# Patient Record
Sex: MALE | Race: WHITE | NOT HISPANIC OR LATINO | Employment: OTHER | URBAN - NONMETROPOLITAN AREA
[De-identification: names, ages, dates, MRNs, and addresses within clinical notes are randomized per-mention and may not be internally consistent; named-entity substitution may affect disease eponyms.]

---

## 2022-06-08 ENCOUNTER — OFFICE VISIT (OUTPATIENT)
Dept: FAMILY MEDICINE CLINIC | Facility: CLINIC | Age: 67
End: 2022-06-08

## 2022-06-08 VITALS
WEIGHT: 262.2 LBS | SYSTOLIC BLOOD PRESSURE: 120 MMHG | TEMPERATURE: 97.5 F | OXYGEN SATURATION: 99 % | RESPIRATION RATE: 12 BRPM | BODY MASS INDEX: 32.6 KG/M2 | DIASTOLIC BLOOD PRESSURE: 80 MMHG | HEART RATE: 61 BPM | HEIGHT: 75 IN

## 2022-06-08 DIAGNOSIS — Z79.899 HIGH RISK MEDICATION USE: ICD-10-CM

## 2022-06-08 DIAGNOSIS — E78.2 HYPERLIPIDEMIA, MIXED: Primary | ICD-10-CM

## 2022-06-08 DIAGNOSIS — Z87.891 HISTORY OF TOBACCO USE: ICD-10-CM

## 2022-06-08 DIAGNOSIS — N18.31 CHRONIC KIDNEY DISEASE, STAGE 3A: ICD-10-CM

## 2022-06-08 DIAGNOSIS — U07.1 COVID-19 VIRUS INFECTION: ICD-10-CM

## 2022-06-08 DIAGNOSIS — R97.20 INCREASED PROSTATE SPECIFIC ANTIGEN (PSA) VELOCITY: ICD-10-CM

## 2022-06-08 DIAGNOSIS — Z13.6 SCREENING FOR AAA (ABDOMINAL AORTIC ANEURYSM): ICD-10-CM

## 2022-06-08 DIAGNOSIS — E53.8 VITAMIN B12 DEFICIENCY: ICD-10-CM

## 2022-06-08 DIAGNOSIS — Z12.5 SCREENING FOR PROSTATE CANCER: ICD-10-CM

## 2022-06-08 DIAGNOSIS — E55.9 VITAMIN D DEFICIENCY: ICD-10-CM

## 2022-06-08 DIAGNOSIS — I10 HYPERTENSION, ESSENTIAL: ICD-10-CM

## 2022-06-08 PROCEDURE — 36415 COLL VENOUS BLD VENIPUNCTURE: CPT | Performed by: FAMILY MEDICINE

## 2022-06-08 PROCEDURE — G0009 ADMIN PNEUMOCOCCAL VACCINE: HCPCS | Performed by: FAMILY MEDICINE

## 2022-06-08 PROCEDURE — 90677 PCV20 VACCINE IM: CPT | Performed by: FAMILY MEDICINE

## 2022-06-08 PROCEDURE — 99214 OFFICE O/P EST MOD 30 MIN: CPT | Performed by: FAMILY MEDICINE

## 2022-06-08 RX ORDER — FLUOXETINE HYDROCHLORIDE 40 MG/1
CAPSULE ORAL
COMMUNITY
End: 2022-06-08 | Stop reason: SDUPTHER

## 2022-06-08 RX ORDER — SILDENAFIL 50 MG/1
50 TABLET, FILM COATED ORAL AS NEEDED
Qty: 30 TABLET | Refills: 2 | Status: SHIPPED | OUTPATIENT
Start: 2022-06-08 | End: 2023-01-10 | Stop reason: SDUPTHER

## 2022-06-08 RX ORDER — PRAVASTATIN SODIUM 40 MG
TABLET ORAL
COMMUNITY
End: 2022-06-08 | Stop reason: SDUPTHER

## 2022-06-08 RX ORDER — PANTOPRAZOLE SODIUM 40 MG/1
40 TABLET, DELAYED RELEASE ORAL DAILY
Qty: 90 TABLET | Refills: 1 | Status: SHIPPED | OUTPATIENT
Start: 2022-06-08 | End: 2022-12-05

## 2022-06-08 RX ORDER — PANTOPRAZOLE SODIUM 40 MG/1
TABLET, DELAYED RELEASE ORAL
COMMUNITY
End: 2022-06-08 | Stop reason: SDUPTHER

## 2022-06-08 RX ORDER — FLUOXETINE HYDROCHLORIDE 40 MG/1
40 CAPSULE ORAL DAILY
Qty: 90 CAPSULE | Refills: 1 | Status: SHIPPED | OUTPATIENT
Start: 2022-06-08 | End: 2023-01-10 | Stop reason: SDUPTHER

## 2022-06-08 RX ORDER — ALBUTEROL SULFATE 90 UG/1
2 AEROSOL, METERED RESPIRATORY (INHALATION) EVERY 4 HOURS PRN
Qty: 3 G | Refills: 1 | Status: SHIPPED | OUTPATIENT
Start: 2022-06-08 | End: 2023-01-10

## 2022-06-08 RX ORDER — SILDENAFIL 50 MG/1
TABLET, FILM COATED ORAL
COMMUNITY
End: 2022-06-08 | Stop reason: SDUPTHER

## 2022-06-08 RX ORDER — LISINOPRIL 20 MG/1
20 TABLET ORAL DAILY
Qty: 90 TABLET | Refills: 1 | Status: SHIPPED | OUTPATIENT
Start: 2022-06-08 | End: 2023-01-10

## 2022-06-08 RX ORDER — PRAVASTATIN SODIUM 40 MG
40 TABLET ORAL DAILY
Qty: 90 TABLET | Refills: 1 | Status: SHIPPED | OUTPATIENT
Start: 2022-06-08 | End: 2023-01-05

## 2022-06-08 NOTE — PROGRESS NOTES
Patient Name: Ebenezer Che  : 1955   MRN: [id number]     Chief Complaint:    Chief Complaint   Patient presents with   • Med Refill     Medication refills    • COVID     Pt here for follow up on COVID tested pos 3 weeks       History of Present Illness: Ebenezer Che is a 66 y.o. male who is here today for follow up. On BG and BP  HPI        Review of Systems:   Review of Systems   Constitutional: Negative.    HENT: Negative.    Eyes: Negative.    Respiratory: Negative.    Cardiovascular: Negative.    Gastrointestinal: Negative.    Neurological: Negative.         Past Medical History:   Past Medical History:   Diagnosis Date   • Actinic keratosis    • Anxiety syndrome    • BPH (benign prostatic hyperplasia)    • Diverticulosis    • Essential hypertension    • Eye injury     BB INJURY RIGHT EYE AS TEENAGER   • GERD without esophagitis    • High risk medication use     DRUG THERAPY FINDING   • Hx of adenomatous colonic polyps    • Low vitamin B12 level    • Mixed hyperlipidemia    • Multiple lentigines syndrome (HCC)    • Prediabetes    • Vitamin D deficiency        Past Surgical History:   Past Surgical History:   Procedure Laterality Date   • ENDOSCOPY         Family History: History reviewed. No pertinent family history.    Social History:   Social History     Socioeconomic History   • Marital status:    Tobacco Use   • Smoking status: Former Smoker     Packs/day: 0.25     Years: 36.00     Pack years: 9.00     Start date:      Quit date:      Years since quittin.4   • Smokeless tobacco: Never Used   Substance and Sexual Activity   • Alcohol use: Defer   • Drug use: Never   • Sexual activity: Defer       Medications:     Current Outpatient Medications:   •  FLUoxetine (PROzac) 40 MG capsule, Take 1 capsule by mouth Daily., Disp: 90 capsule, Rfl: 1  •  Metoprolol Succinate 25 MG capsule extended-release 24 hour sprinkle, Take 25 mg by mouth 2 (Two) Times a Day., Disp: 180 capsule, Rfl:  "1  •  pantoprazole (PROTONIX) 40 MG EC tablet, Take 1 tablet by mouth Daily., Disp: 90 tablet, Rfl: 1  •  pravastatin (PRAVACHOL) 40 MG tablet, Take 1 tablet by mouth Daily., Disp: 90 tablet, Rfl: 1  •  sildenafil (VIAGRA) 50 MG tablet, Take 1 tablet by mouth As Needed for Erectile Dysfunction., Disp: 30 tablet, Rfl: 2  •  albuterol sulfate  (90 Base) MCG/ACT inhaler, Inhale 2 puffs Every 4 (Four) Hours As Needed for Wheezing., Disp: 3 g, Rfl: 1  •  lisinopril (PRINIVIL,ZESTRIL) 20 MG tablet, Take 1 tablet by mouth Daily., Disp: 90 tablet, Rfl: 1    Allergies:   No Known Allergies      Physical Exam:  Vital Signs:   Vitals:    06/08/22 1002   BP: 120/80   BP Location: Left arm   Patient Position: Sitting   Cuff Size: Adult   Pulse: 61   Resp: 12   Temp: 97.5 °F (36.4 °C)   SpO2: 99%   Weight: 119 kg (262 lb 3.2 oz)   Height: 190.5 cm (75\")   PainSc: 0-No pain     Body mass index is 32.77 kg/m².     Physical Exam  Vitals and nursing note reviewed.   Constitutional:       Appearance: Normal appearance. He is normal weight.   HENT:      Head: Normocephalic and atraumatic.      Right Ear: Tympanic membrane, ear canal and external ear normal.      Left Ear: Tympanic membrane, ear canal and external ear normal.      Nose: Nose normal.      Mouth/Throat:      Mouth: Mucous membranes are dry.      Pharynx: Oropharynx is clear.   Eyes:      Extraocular Movements: Extraocular movements intact.      Conjunctiva/sclera: Conjunctivae normal.      Pupils: Pupils are equal, round, and reactive to light.   Cardiovascular:      Rate and Rhythm: Normal rate and regular rhythm.      Pulses: Normal pulses.      Heart sounds: Normal heart sounds.   Pulmonary:      Effort: Pulmonary effort is normal.      Breath sounds: Normal breath sounds.   Musculoskeletal:      Cervical back: Normal range of motion and neck supple.   Feet:      Comments:      Neurological:      Mental Status: He is alert. "         Procedures      Assessment/Plan:   Diagnoses and all orders for this visit:    1. Hyperlipidemia, mixed (Primary)  Assessment & Plan:  Blood work today    Orders:  -     FLUoxetine (PROzac) 40 MG capsule; Take 1 capsule by mouth Daily.  Dispense: 90 capsule; Refill: 1  -     Metoprolol Succinate 25 MG capsule extended-release 24 hour sprinkle; Take 25 mg by mouth 2 (Two) Times a Day.  Dispense: 180 capsule; Refill: 1  -     pantoprazole (PROTONIX) 40 MG EC tablet; Take 1 tablet by mouth Daily.  Dispense: 90 tablet; Refill: 1  -     pravastatin (PRAVACHOL) 40 MG tablet; Take 1 tablet by mouth Daily.  Dispense: 90 tablet; Refill: 1  -     sildenafil (VIAGRA) 50 MG tablet; Take 1 tablet by mouth As Needed for Erectile Dysfunction.  Dispense: 30 tablet; Refill: 2  -     lisinopril (PRINIVIL,ZESTRIL) 20 MG tablet; Take 1 tablet by mouth Daily.  Dispense: 90 tablet; Refill: 1  -     [place] aaa screen limited; Future  -     CBC & Differential; Future  -     Lipid Panel; Future  -     Comprehensive Metabolic Panel; Future  -     TSH Rfx On Abnormal To Free T4; Future  -     Hepatitis C Antibody; Future  -     PSA Screen; Future    2. High risk medication use  Assessment & Plan:  Blood work today    Orders:  -     FLUoxetine (PROzac) 40 MG capsule; Take 1 capsule by mouth Daily.  Dispense: 90 capsule; Refill: 1  -     Metoprolol Succinate 25 MG capsule extended-release 24 hour sprinkle; Take 25 mg by mouth 2 (Two) Times a Day.  Dispense: 180 capsule; Refill: 1  -     pantoprazole (PROTONIX) 40 MG EC tablet; Take 1 tablet by mouth Daily.  Dispense: 90 tablet; Refill: 1  -     pravastatin (PRAVACHOL) 40 MG tablet; Take 1 tablet by mouth Daily.  Dispense: 90 tablet; Refill: 1  -     sildenafil (VIAGRA) 50 MG tablet; Take 1 tablet by mouth As Needed for Erectile Dysfunction.  Dispense: 30 tablet; Refill: 2  -     lisinopril (PRINIVIL,ZESTRIL) 20 MG tablet; Take 1 tablet by mouth Daily.  Dispense: 90 tablet; Refill: 1  -      [place] aaa screen limited; Future  -     CBC & Differential; Future  -     Lipid Panel; Future  -     Comprehensive Metabolic Panel; Future  -     TSH Rfx On Abnormal To Free T4; Future  -     Hepatitis C Antibody; Future  -     PSA Screen; Future    3. History of tobacco use  Assessment & Plan:  Screening abdominal ultrasound.    Orders:  -     FLUoxetine (PROzac) 40 MG capsule; Take 1 capsule by mouth Daily.  Dispense: 90 capsule; Refill: 1  -     Metoprolol Succinate 25 MG capsule extended-release 24 hour sprinkle; Take 25 mg by mouth 2 (Two) Times a Day.  Dispense: 180 capsule; Refill: 1  -     pantoprazole (PROTONIX) 40 MG EC tablet; Take 1 tablet by mouth Daily.  Dispense: 90 tablet; Refill: 1  -     pravastatin (PRAVACHOL) 40 MG tablet; Take 1 tablet by mouth Daily.  Dispense: 90 tablet; Refill: 1  -     sildenafil (VIAGRA) 50 MG tablet; Take 1 tablet by mouth As Needed for Erectile Dysfunction.  Dispense: 30 tablet; Refill: 2  -     lisinopril (PRINIVIL,ZESTRIL) 20 MG tablet; Take 1 tablet by mouth Daily.  Dispense: 90 tablet; Refill: 1  -     [place] aaa screen limited; Future  -     CBC & Differential; Future  -     Lipid Panel; Future  -     Comprehensive Metabolic Panel; Future  -     TSH Rfx On Abnormal To Free T4; Future  -     Hepatitis C Antibody; Future  -     PSA Screen; Future    4. Screening for AAA (abdominal aortic aneurysm)  Assessment & Plan:  We will get a screening ultrasound    Orders:  -     FLUoxetine (PROzac) 40 MG capsule; Take 1 capsule by mouth Daily.  Dispense: 90 capsule; Refill: 1  -     Metoprolol Succinate 25 MG capsule extended-release 24 hour sprinkle; Take 25 mg by mouth 2 (Two) Times a Day.  Dispense: 180 capsule; Refill: 1  -     pantoprazole (PROTONIX) 40 MG EC tablet; Take 1 tablet by mouth Daily.  Dispense: 90 tablet; Refill: 1  -     pravastatin (PRAVACHOL) 40 MG tablet; Take 1 tablet by mouth Daily.  Dispense: 90 tablet; Refill: 1  -     sildenafil (VIAGRA) 50 MG tablet; Take 1  tablet by mouth As Needed for Erectile Dysfunction.  Dispense: 30 tablet; Refill: 2  -     lisinopril (PRINIVIL,ZESTRIL) 20 MG tablet; Take 1 tablet by mouth Daily.  Dispense: 90 tablet; Refill: 1  -     [place] aaa screen limited; Future  -     CBC & Differential; Future  -     Lipid Panel; Future  -     Comprehensive Metabolic Panel; Future  -     TSH Rfx On Abnormal To Free T4; Future  -     Hepatitis C Antibody; Future  -     PSA Screen; Future    5. Hypertension, essential  Assessment & Plan:  Discussed with patient to monitor their blood pressure and if systolic blood pressure goes above 140 or diastolic is above 90 to return to clinic.  Take medicines as directed, call for any problems, patient not having or any worrisome symptoms.        Orders:  -     FLUoxetine (PROzac) 40 MG capsule; Take 1 capsule by mouth Daily.  Dispense: 90 capsule; Refill: 1  -     Metoprolol Succinate 25 MG capsule extended-release 24 hour sprinkle; Take 25 mg by mouth 2 (Two) Times a Day.  Dispense: 180 capsule; Refill: 1  -     pantoprazole (PROTONIX) 40 MG EC tablet; Take 1 tablet by mouth Daily.  Dispense: 90 tablet; Refill: 1  -     pravastatin (PRAVACHOL) 40 MG tablet; Take 1 tablet by mouth Daily.  Dispense: 90 tablet; Refill: 1  -     sildenafil (VIAGRA) 50 MG tablet; Take 1 tablet by mouth As Needed for Erectile Dysfunction.  Dispense: 30 tablet; Refill: 2  -     lisinopril (PRINIVIL,ZESTRIL) 20 MG tablet; Take 1 tablet by mouth Daily.  Dispense: 90 tablet; Refill: 1  -     [place] aaa screen limited; Future  -     CBC & Differential; Future  -     Lipid Panel; Future  -     Comprehensive Metabolic Panel; Future  -     TSH Rfx On Abnormal To Free T4; Future  -     Hepatitis C Antibody; Future  -     PSA Screen; Future    6. Chronic kidney disease, stage 3a (HCC)  Assessment & Plan:  Patient is instructed to not take any NSAIDs.  Medicines as directed.  Stay well-hydrated.      Orders:  -     FLUoxetine (PROzac) 40 MG capsule; Take 1  capsule by mouth Daily.  Dispense: 90 capsule; Refill: 1  -     Metoprolol Succinate 25 MG capsule extended-release 24 hour sprinkle; Take 25 mg by mouth 2 (Two) Times a Day.  Dispense: 180 capsule; Refill: 1  -     pantoprazole (PROTONIX) 40 MG EC tablet; Take 1 tablet by mouth Daily.  Dispense: 90 tablet; Refill: 1  -     pravastatin (PRAVACHOL) 40 MG tablet; Take 1 tablet by mouth Daily.  Dispense: 90 tablet; Refill: 1  -     sildenafil (VIAGRA) 50 MG tablet; Take 1 tablet by mouth As Needed for Erectile Dysfunction.  Dispense: 30 tablet; Refill: 2  -     lisinopril (PRINIVIL,ZESTRIL) 20 MG tablet; Take 1 tablet by mouth Daily.  Dispense: 90 tablet; Refill: 1  -     [place] aaa screen limited; Future  -     CBC & Differential; Future  -     Lipid Panel; Future  -     Comprehensive Metabolic Panel; Future  -     TSH Rfx On Abnormal To Free T4; Future  -     Hepatitis C Antibody; Future  -     PSA Screen; Future    7. Vitamin D deficiency  Assessment & Plan:  Blood work today    Orders:  -     FLUoxetine (PROzac) 40 MG capsule; Take 1 capsule by mouth Daily.  Dispense: 90 capsule; Refill: 1  -     Metoprolol Succinate 25 MG capsule extended-release 24 hour sprinkle; Take 25 mg by mouth 2 (Two) Times a Day.  Dispense: 180 capsule; Refill: 1  -     pantoprazole (PROTONIX) 40 MG EC tablet; Take 1 tablet by mouth Daily.  Dispense: 90 tablet; Refill: 1  -     pravastatin (PRAVACHOL) 40 MG tablet; Take 1 tablet by mouth Daily.  Dispense: 90 tablet; Refill: 1  -     sildenafil (VIAGRA) 50 MG tablet; Take 1 tablet by mouth As Needed for Erectile Dysfunction.  Dispense: 30 tablet; Refill: 2  -     lisinopril (PRINIVIL,ZESTRIL) 20 MG tablet; Take 1 tablet by mouth Daily.  Dispense: 90 tablet; Refill: 1  -     [place] aaa screen limited; Future  -     CBC & Differential; Future  -     Lipid Panel; Future  -     Comprehensive Metabolic Panel; Future  -     TSH Rfx On Abnormal To Free T4; Future  -     Hepatitis C Antibody; Future  -      PSA Screen; Future    8. Increased prostate specific antigen (PSA) velocity  Assessment & Plan:  We will check PSA    Orders:  -     FLUoxetine (PROzac) 40 MG capsule; Take 1 capsule by mouth Daily.  Dispense: 90 capsule; Refill: 1  -     Metoprolol Succinate 25 MG capsule extended-release 24 hour sprinkle; Take 25 mg by mouth 2 (Two) Times a Day.  Dispense: 180 capsule; Refill: 1  -     pantoprazole (PROTONIX) 40 MG EC tablet; Take 1 tablet by mouth Daily.  Dispense: 90 tablet; Refill: 1  -     pravastatin (PRAVACHOL) 40 MG tablet; Take 1 tablet by mouth Daily.  Dispense: 90 tablet; Refill: 1  -     sildenafil (VIAGRA) 50 MG tablet; Take 1 tablet by mouth As Needed for Erectile Dysfunction.  Dispense: 30 tablet; Refill: 2  -     lisinopril (PRINIVIL,ZESTRIL) 20 MG tablet; Take 1 tablet by mouth Daily.  Dispense: 90 tablet; Refill: 1  -     [place] aaa screen limited; Future  -     CBC & Differential; Future  -     Lipid Panel; Future  -     Comprehensive Metabolic Panel; Future  -     TSH Rfx On Abnormal To Free T4; Future  -     Hepatitis C Antibody; Future  -     PSA Screen; Future    9. Vitamin B12 deficiency  Assessment & Plan:  Blood work today    Orders:  -     FLUoxetine (PROzac) 40 MG capsule; Take 1 capsule by mouth Daily.  Dispense: 90 capsule; Refill: 1  -     Metoprolol Succinate 25 MG capsule extended-release 24 hour sprinkle; Take 25 mg by mouth 2 (Two) Times a Day.  Dispense: 180 capsule; Refill: 1  -     pantoprazole (PROTONIX) 40 MG EC tablet; Take 1 tablet by mouth Daily.  Dispense: 90 tablet; Refill: 1  -     pravastatin (PRAVACHOL) 40 MG tablet; Take 1 tablet by mouth Daily.  Dispense: 90 tablet; Refill: 1  -     sildenafil (VIAGRA) 50 MG tablet; Take 1 tablet by mouth As Needed for Erectile Dysfunction.  Dispense: 30 tablet; Refill: 2  -     lisinopril (PRINIVIL,ZESTRIL) 20 MG tablet; Take 1 tablet by mouth Daily.  Dispense: 90 tablet; Refill: 1  -     [place] aaa screen limited; Future  -     CBC &  Differential; Future  -     Lipid Panel; Future  -     Comprehensive Metabolic Panel; Future  -     TSH Rfx On Abnormal To Free T4; Future  -     Hepatitis C Antibody; Future  -     PSA Screen; Future    10. Screening for prostate cancer  Assessment & Plan:  We will get a screening ultrasound    Orders:  -     FLUoxetine (PROzac) 40 MG capsule; Take 1 capsule by mouth Daily.  Dispense: 90 capsule; Refill: 1  -     Metoprolol Succinate 25 MG capsule extended-release 24 hour sprinkle; Take 25 mg by mouth 2 (Two) Times a Day.  Dispense: 180 capsule; Refill: 1  -     pantoprazole (PROTONIX) 40 MG EC tablet; Take 1 tablet by mouth Daily.  Dispense: 90 tablet; Refill: 1  -     pravastatin (PRAVACHOL) 40 MG tablet; Take 1 tablet by mouth Daily.  Dispense: 90 tablet; Refill: 1  -     sildenafil (VIAGRA) 50 MG tablet; Take 1 tablet by mouth As Needed for Erectile Dysfunction.  Dispense: 30 tablet; Refill: 2  -     lisinopril (PRINIVIL,ZESTRIL) 20 MG tablet; Take 1 tablet by mouth Daily.  Dispense: 90 tablet; Refill: 1  -     [place] aaa screen limited; Future  -     CBC & Differential; Future  -     Lipid Panel; Future  -     Comprehensive Metabolic Panel; Future  -     TSH Rfx On Abnormal To Free T4; Future  -     Hepatitis C Antibody; Future  -     PSA Screen; Future    11. COVID-19 virus infection  Assessment & Plan:  Tested negative today.  We will give him a ProAir inhaler.      Other orders  -     Pneumococcal Conjugate Vaccine 20-Valent (PCV20)  -     albuterol sulfate  (90 Base) MCG/ACT inhaler; Inhale 2 puffs Every 4 (Four) Hours As Needed for Wheezing.  Dispense: 3 g; Refill: 1           Follow Up:   Return in about 6 months (around 12/8/2022) for Bloodwork 1 week prior to next appointment.    Epifanio Goodman MD  [facility]     Answers for HPI/ROS submitted by the patient on 6/7/2022  What is the primary reason for your visit?: Other  Please describe your symptoms.: Renew my  prescriptions, Post Covid check up  Have you had these symptoms before?: No  How long have you been having these symptoms?: 1-2 weeks  Please describe any probable cause for these symptoms. : Covid

## 2022-06-09 LAB
ALBUMIN SERPL-MCNC: 4.7 G/DL (ref 3.8–4.8)
ALBUMIN/GLOB SERPL: 2 {RATIO} (ref 1.2–2.2)
ALP SERPL-CCNC: 49 IU/L (ref 44–121)
ALT SERPL-CCNC: 32 IU/L (ref 0–44)
AST SERPL-CCNC: 20 IU/L (ref 0–40)
BASOPHILS # BLD AUTO: 0 X10E3/UL (ref 0–0.2)
BASOPHILS NFR BLD AUTO: 1 %
BILIRUB SERPL-MCNC: 0.5 MG/DL (ref 0–1.2)
BUN SERPL-MCNC: 15 MG/DL (ref 8–27)
BUN/CREAT SERPL: 13 (ref 10–24)
CALCIUM SERPL-MCNC: 9.3 MG/DL (ref 8.6–10.2)
CHLORIDE SERPL-SCNC: 103 MMOL/L (ref 96–106)
CHOLEST SERPL-MCNC: 208 MG/DL (ref 100–199)
CO2 SERPL-SCNC: 23 MMOL/L (ref 20–29)
CREAT SERPL-MCNC: 1.16 MG/DL (ref 0.76–1.27)
EGFRCR SERPLBLD CKD-EPI 2021: 70 ML/MIN/1.73
EOSINOPHIL # BLD AUTO: 0.2 X10E3/UL (ref 0–0.4)
EOSINOPHIL NFR BLD AUTO: 3 %
ERYTHROCYTE [DISTWIDTH] IN BLOOD BY AUTOMATED COUNT: 12.6 % (ref 11.6–15.4)
GLOBULIN SER CALC-MCNC: 2.3 G/DL (ref 1.5–4.5)
GLUCOSE SERPL-MCNC: 105 MG/DL (ref 65–99)
HCT VFR BLD AUTO: 46.1 % (ref 37.5–51)
HCV AB S/CO SERPL IA: 0.1 S/CO RATIO (ref 0–0.9)
HDLC SERPL-MCNC: 48 MG/DL
HGB BLD-MCNC: 15.5 G/DL (ref 13–17.7)
IMM GRANULOCYTES # BLD AUTO: 0 X10E3/UL (ref 0–0.1)
IMM GRANULOCYTES NFR BLD AUTO: 0 %
LDLC SERPL CALC-MCNC: 123 MG/DL (ref 0–99)
LYMPHOCYTES # BLD AUTO: 2.2 X10E3/UL (ref 0.7–3.1)
LYMPHOCYTES NFR BLD AUTO: 35 %
MCH RBC QN AUTO: 29.6 PG (ref 26.6–33)
MCHC RBC AUTO-ENTMCNC: 33.6 G/DL (ref 31.5–35.7)
MCV RBC AUTO: 88 FL (ref 79–97)
MONOCYTES # BLD AUTO: 0.5 X10E3/UL (ref 0.1–0.9)
MONOCYTES NFR BLD AUTO: 8 %
NEUTROPHILS # BLD AUTO: 3.5 X10E3/UL (ref 1.4–7)
NEUTROPHILS NFR BLD AUTO: 53 %
PLATELET # BLD AUTO: 338 X10E3/UL (ref 150–450)
POTASSIUM SERPL-SCNC: 4.8 MMOL/L (ref 3.5–5.2)
PROT SERPL-MCNC: 7 G/DL (ref 6–8.5)
PSA SERPL-MCNC: 2.2 NG/ML (ref 0–4)
RBC # BLD AUTO: 5.23 X10E6/UL (ref 4.14–5.8)
SODIUM SERPL-SCNC: 141 MMOL/L (ref 134–144)
TRIGL SERPL-MCNC: 209 MG/DL (ref 0–149)
TSH SERPL DL<=0.005 MIU/L-ACNC: 1.86 UIU/ML (ref 0.45–4.5)
VLDLC SERPL CALC-MCNC: 37 MG/DL (ref 5–40)
WBC # BLD AUTO: 6.4 X10E3/UL (ref 3.4–10.8)

## 2022-06-28 DIAGNOSIS — E78.2 HYPERLIPIDEMIA, MIXED: ICD-10-CM

## 2022-06-28 DIAGNOSIS — Z13.6 SCREENING FOR AAA (ABDOMINAL AORTIC ANEURYSM): ICD-10-CM

## 2022-06-28 DIAGNOSIS — Z12.5 SCREENING FOR PROSTATE CANCER: ICD-10-CM

## 2022-06-28 DIAGNOSIS — E53.8 VITAMIN B12 DEFICIENCY: ICD-10-CM

## 2022-06-28 DIAGNOSIS — Z87.891 HISTORY OF TOBACCO USE: ICD-10-CM

## 2022-06-28 DIAGNOSIS — N18.31 CHRONIC KIDNEY DISEASE, STAGE 3A: ICD-10-CM

## 2022-06-28 DIAGNOSIS — I10 HYPERTENSION, ESSENTIAL: ICD-10-CM

## 2022-06-28 DIAGNOSIS — E55.9 VITAMIN D DEFICIENCY: ICD-10-CM

## 2022-06-28 DIAGNOSIS — Z79.899 HIGH RISK MEDICATION USE: ICD-10-CM

## 2022-06-28 DIAGNOSIS — R97.20 INCREASED PROSTATE SPECIFIC ANTIGEN (PSA) VELOCITY: ICD-10-CM

## 2022-10-01 DIAGNOSIS — E78.2 HYPERLIPIDEMIA, MIXED: ICD-10-CM

## 2022-10-01 DIAGNOSIS — E53.8 VITAMIN B12 DEFICIENCY: ICD-10-CM

## 2022-10-01 DIAGNOSIS — Z12.5 SCREENING FOR PROSTATE CANCER: ICD-10-CM

## 2022-10-01 DIAGNOSIS — R97.20 INCREASED PROSTATE SPECIFIC ANTIGEN (PSA) VELOCITY: ICD-10-CM

## 2022-10-01 DIAGNOSIS — N18.31 CHRONIC KIDNEY DISEASE, STAGE 3A: ICD-10-CM

## 2022-10-01 DIAGNOSIS — Z79.899 HIGH RISK MEDICATION USE: ICD-10-CM

## 2022-10-01 DIAGNOSIS — Z87.891 HISTORY OF TOBACCO USE: ICD-10-CM

## 2022-10-01 DIAGNOSIS — I10 HYPERTENSION, ESSENTIAL: ICD-10-CM

## 2022-10-01 DIAGNOSIS — Z13.6 SCREENING FOR AAA (ABDOMINAL AORTIC ANEURYSM): ICD-10-CM

## 2022-10-01 DIAGNOSIS — E55.9 VITAMIN D DEFICIENCY: ICD-10-CM

## 2022-10-03 RX ORDER — PRAVASTATIN SODIUM 40 MG
TABLET ORAL
Qty: 90 TABLET | Refills: 0 | OUTPATIENT
Start: 2022-10-03

## 2022-12-05 DIAGNOSIS — E55.9 VITAMIN D DEFICIENCY: ICD-10-CM

## 2022-12-05 DIAGNOSIS — I10 HYPERTENSION, ESSENTIAL: ICD-10-CM

## 2022-12-05 DIAGNOSIS — R97.20 INCREASED PROSTATE SPECIFIC ANTIGEN (PSA) VELOCITY: ICD-10-CM

## 2022-12-05 DIAGNOSIS — Z87.891 HISTORY OF TOBACCO USE: ICD-10-CM

## 2022-12-05 DIAGNOSIS — E78.2 HYPERLIPIDEMIA, MIXED: ICD-10-CM

## 2022-12-05 DIAGNOSIS — Z12.5 SCREENING FOR PROSTATE CANCER: ICD-10-CM

## 2022-12-05 DIAGNOSIS — Z79.899 HIGH RISK MEDICATION USE: ICD-10-CM

## 2022-12-05 DIAGNOSIS — Z13.6 SCREENING FOR AAA (ABDOMINAL AORTIC ANEURYSM): ICD-10-CM

## 2022-12-05 DIAGNOSIS — N18.31 CHRONIC KIDNEY DISEASE, STAGE 3A: ICD-10-CM

## 2022-12-05 DIAGNOSIS — E53.8 VITAMIN B12 DEFICIENCY: ICD-10-CM

## 2022-12-05 RX ORDER — PANTOPRAZOLE SODIUM 40 MG/1
TABLET, DELAYED RELEASE ORAL
Qty: 90 TABLET | Refills: 0 | Status: SHIPPED | OUTPATIENT
Start: 2022-12-05 | End: 2023-01-10 | Stop reason: SDUPTHER

## 2022-12-05 NOTE — TELEPHONE ENCOUNTER
Rx Refill Note    Requested Prescriptions     Pending Prescriptions Disp Refills   • pantoprazole (PROTONIX) 40 MG EC tablet [Pharmacy Med Name: Pantoprazole Sodium 40 MG Oral Tablet Delayed Release] 90 tablet 0     Sig: Take 1 tablet by mouth once daily        Last office visit with prescribing clinician: 6/8/2022      Next office visit with prescribing clinician: Visit date not found   Last labs:   Last refill: 06/08/2022   Pharmacy (be sure to add in Epic). correct

## 2023-01-05 DIAGNOSIS — E53.8 VITAMIN B12 DEFICIENCY: ICD-10-CM

## 2023-01-05 DIAGNOSIS — Z12.5 SCREENING FOR PROSTATE CANCER: ICD-10-CM

## 2023-01-05 DIAGNOSIS — R97.20 INCREASED PROSTATE SPECIFIC ANTIGEN (PSA) VELOCITY: ICD-10-CM

## 2023-01-05 DIAGNOSIS — I10 HYPERTENSION, ESSENTIAL: ICD-10-CM

## 2023-01-05 DIAGNOSIS — Z79.899 HIGH RISK MEDICATION USE: ICD-10-CM

## 2023-01-05 DIAGNOSIS — N18.31 CHRONIC KIDNEY DISEASE, STAGE 3A: ICD-10-CM

## 2023-01-05 DIAGNOSIS — E55.9 VITAMIN D DEFICIENCY: ICD-10-CM

## 2023-01-05 DIAGNOSIS — E78.2 HYPERLIPIDEMIA, MIXED: ICD-10-CM

## 2023-01-05 DIAGNOSIS — Z87.891 HISTORY OF TOBACCO USE: ICD-10-CM

## 2023-01-05 DIAGNOSIS — Z13.6 SCREENING FOR AAA (ABDOMINAL AORTIC ANEURYSM): ICD-10-CM

## 2023-01-05 RX ORDER — PRAVASTATIN SODIUM 40 MG
TABLET ORAL
Qty: 90 TABLET | Refills: 0 | Status: SHIPPED | OUTPATIENT
Start: 2023-01-05 | End: 2023-01-10 | Stop reason: SDUPTHER

## 2023-01-05 NOTE — TELEPHONE ENCOUNTER
Rx Refill Note    Requested Prescriptions     Pending Prescriptions Disp Refills   • pravastatin (PRAVACHOL) 40 MG tablet [Pharmacy Med Name: Pravastatin Sodium 40 MG Oral Tablet] 90 tablet 0     Sig: Take 1 tablet by mouth once daily        Last office visit with prescribing clinician: 6/8/2022      Next office visit with prescribing clinician: Visit date not found   Last labs:   Last refill: 06/08/2022   Pharmacy (be sure to add in Epic). correct

## 2023-01-10 ENCOUNTER — OFFICE VISIT (OUTPATIENT)
Dept: FAMILY MEDICINE CLINIC | Facility: CLINIC | Age: 68
End: 2023-01-10
Payer: MEDICARE

## 2023-01-10 VITALS
HEART RATE: 63 BPM | OXYGEN SATURATION: 97 % | BODY MASS INDEX: 34.01 KG/M2 | TEMPERATURE: 98.2 F | SYSTOLIC BLOOD PRESSURE: 136 MMHG | HEIGHT: 75 IN | DIASTOLIC BLOOD PRESSURE: 90 MMHG | WEIGHT: 273.5 LBS

## 2023-01-10 DIAGNOSIS — I10 HYPERTENSION, ESSENTIAL: ICD-10-CM

## 2023-01-10 DIAGNOSIS — E53.8 VITAMIN B12 DEFICIENCY: ICD-10-CM

## 2023-01-10 DIAGNOSIS — E78.2 HYPERLIPIDEMIA, MIXED: ICD-10-CM

## 2023-01-10 DIAGNOSIS — N18.31 CHRONIC KIDNEY DISEASE, STAGE 3A: ICD-10-CM

## 2023-01-10 DIAGNOSIS — Z12.5 SCREENING FOR PROSTATE CANCER: ICD-10-CM

## 2023-01-10 DIAGNOSIS — Z87.891 HISTORY OF TOBACCO USE: ICD-10-CM

## 2023-01-10 DIAGNOSIS — Z79.899 HIGH RISK MEDICATION USE: ICD-10-CM

## 2023-01-10 DIAGNOSIS — E55.9 VITAMIN D DEFICIENCY: ICD-10-CM

## 2023-01-10 DIAGNOSIS — R97.20 INCREASED PROSTATE SPECIFIC ANTIGEN (PSA) VELOCITY: ICD-10-CM

## 2023-01-10 DIAGNOSIS — Z13.6 SCREENING FOR AAA (ABDOMINAL AORTIC ANEURYSM): ICD-10-CM

## 2023-01-10 PROCEDURE — 99214 OFFICE O/P EST MOD 30 MIN: CPT | Performed by: NURSE PRACTITIONER

## 2023-01-10 RX ORDER — PANTOPRAZOLE SODIUM 40 MG/1
40 TABLET, DELAYED RELEASE ORAL DAILY
Qty: 90 TABLET | Refills: 3 | Status: SHIPPED | OUTPATIENT
Start: 2023-01-10

## 2023-01-10 RX ORDER — SILDENAFIL 50 MG/1
50 TABLET, FILM COATED ORAL AS NEEDED
Qty: 30 TABLET | Refills: 5 | Status: SHIPPED | OUTPATIENT
Start: 2023-01-10

## 2023-01-10 RX ORDER — PRAVASTATIN SODIUM 40 MG
40 TABLET ORAL DAILY
Qty: 90 TABLET | Refills: 1 | Status: SHIPPED | OUTPATIENT
Start: 2023-01-10

## 2023-01-10 RX ORDER — FLUOXETINE HYDROCHLORIDE 40 MG/1
40 CAPSULE ORAL DAILY
Qty: 90 CAPSULE | Refills: 3 | Status: SHIPPED | OUTPATIENT
Start: 2023-01-10

## 2023-01-10 NOTE — PROGRESS NOTES
Chief Complaint  Med Refill and Dizziness (X2M)    Subjective        Ebenezer Che presents to [facility]  History of Present Illness  He has been getting dizzy since using his new cpap mask.     Objective   Vital Signs:  /90 (BP Location: Left arm, Patient Position: Sitting, Cuff Size: Large Adult)   Pulse 63   Temp 98.2 °F (36.8 °C) (Temporal)   Ht 190.5 cm (75\")   Wt 124 kg (273 lb 8 oz)   SpO2 97%   BMI 34.19 kg/m²   Estimated body mass index is 34.19 kg/m² as calculated from the following:    Height as of this encounter: 190.5 cm (75\").    Weight as of this encounter: 124 kg (273 lb 8 oz).             Physical Exam  Vitals and nursing note reviewed.   Constitutional:       Appearance: Normal appearance.   HENT:      Head: Normocephalic and atraumatic.      Right Ear: Tympanic membrane and ear canal normal.      Left Ear: Tympanic membrane and ear canal normal.      Nose: Nose normal.      Mouth/Throat:      Pharynx: Oropharynx is clear.   Eyes:      Extraocular Movements: Extraocular movements intact.      Conjunctiva/sclera: Conjunctivae normal.      Pupils: Pupils are equal, round, and reactive to light.   Cardiovascular:      Rate and Rhythm: Normal rate and regular rhythm.      Heart sounds: Normal heart sounds.   Pulmonary:      Effort: Pulmonary effort is normal.      Breath sounds: Normal breath sounds.   Abdominal:      General: Abdomen is flat. Bowel sounds are normal. There is no distension.      Palpations: Abdomen is soft.      Tenderness: There is no abdominal tenderness. There is no guarding or rebound.   Musculoskeletal:         General: Normal range of motion.      Cervical back: Normal range of motion and neck supple.   Skin:     General: Skin is warm and dry.   Neurological:      General: No focal deficit present.      Mental Status: He is alert and oriented to person, place, and time. Mental status is at baseline.   Psychiatric:         Mood and Affect:  Mood normal.         Behavior: Behavior normal.         Thought Content: Thought content normal.         Judgment: Judgment normal.        Result Review :                   Assessment and Plan   Diagnoses and all orders for this visit:    1. Hyperlipidemia, mixed  Assessment & Plan:  Lipid abnormalities are improving with treatment.  Pharmacotherapy as ordered.  Lipids will be reassessed in 6 months.    Orders:  -     FLUoxetine (PROzac) 40 MG capsule; Take 1 capsule by mouth Daily.  Dispense: 90 capsule; Refill: 3  -     Metoprolol Succinate 25 MG capsule extended-release 24 hour sprinkle; Take 25 mg by mouth 2 (Two) Times a Day.  Dispense: 180 capsule; Refill: 3  -     pantoprazole (PROTONIX) 40 MG EC tablet; Take 1 tablet by mouth Daily.  Dispense: 90 tablet; Refill: 3  -     pravastatin (PRAVACHOL) 40 MG tablet; Take 1 tablet by mouth Daily.  Dispense: 90 tablet; Refill: 1  -     sildenafil (VIAGRA) 50 MG tablet; Take 1 tablet by mouth As Needed for Erectile Dysfunction.  Dispense: 30 tablet; Refill: 5  -     Lipid panel; Future    2. High risk medication use  Assessment & Plan:  Continue current meds.    Orders:  -     FLUoxetine (PROzac) 40 MG capsule; Take 1 capsule by mouth Daily.  Dispense: 90 capsule; Refill: 3  -     Metoprolol Succinate 25 MG capsule extended-release 24 hour sprinkle; Take 25 mg by mouth 2 (Two) Times a Day.  Dispense: 180 capsule; Refill: 3  -     pantoprazole (PROTONIX) 40 MG EC tablet; Take 1 tablet by mouth Daily.  Dispense: 90 tablet; Refill: 3  -     pravastatin (PRAVACHOL) 40 MG tablet; Take 1 tablet by mouth Daily.  Dispense: 90 tablet; Refill: 1  -     sildenafil (VIAGRA) 50 MG tablet; Take 1 tablet by mouth As Needed for Erectile Dysfunction.  Dispense: 30 tablet; Refill: 5  -     CBC (No Diff); Future  -     Comprehensive metabolic panel; Future  -     Lipid panel; Future  -     TSH; Future    3. History of tobacco use  -     FLUoxetine (PROzac) 40 MG capsule; Take 1 capsule  by mouth Daily.  Dispense: 90 capsule; Refill: 3  -     Metoprolol Succinate 25 MG capsule extended-release 24 hour sprinkle; Take 25 mg by mouth 2 (Two) Times a Day.  Dispense: 180 capsule; Refill: 3  -     pantoprazole (PROTONIX) 40 MG EC tablet; Take 1 tablet by mouth Daily.  Dispense: 90 tablet; Refill: 3  -     pravastatin (PRAVACHOL) 40 MG tablet; Take 1 tablet by mouth Daily.  Dispense: 90 tablet; Refill: 1  -     sildenafil (VIAGRA) 50 MG tablet; Take 1 tablet by mouth As Needed for Erectile Dysfunction.  Dispense: 30 tablet; Refill: 5    4. Screening for AAA (abdominal aortic aneurysm)  -     FLUoxetine (PROzac) 40 MG capsule; Take 1 capsule by mouth Daily.  Dispense: 90 capsule; Refill: 3  -     Metoprolol Succinate 25 MG capsule extended-release 24 hour sprinkle; Take 25 mg by mouth 2 (Two) Times a Day.  Dispense: 180 capsule; Refill: 3  -     pantoprazole (PROTONIX) 40 MG EC tablet; Take 1 tablet by mouth Daily.  Dispense: 90 tablet; Refill: 3  -     pravastatin (PRAVACHOL) 40 MG tablet; Take 1 tablet by mouth Daily.  Dispense: 90 tablet; Refill: 1  -     sildenafil (VIAGRA) 50 MG tablet; Take 1 tablet by mouth As Needed for Erectile Dysfunction.  Dispense: 30 tablet; Refill: 5    5. Hypertension, essential  Assessment & Plan:  Hypertension is improving with treatment.  Continue current treatment regimen.  Dietary sodium restriction.  Regular aerobic exercise.  Continue current medications.  Blood pressure will be reassessed at the next regular appointment.    Orders:  -     FLUoxetine (PROzac) 40 MG capsule; Take 1 capsule by mouth Daily.  Dispense: 90 capsule; Refill: 3  -     Metoprolol Succinate 25 MG capsule extended-release 24 hour sprinkle; Take 25 mg by mouth 2 (Two) Times a Day.  Dispense: 180 capsule; Refill: 3  -     pantoprazole (PROTONIX) 40 MG EC tablet; Take 1 tablet by mouth Daily.  Dispense: 90 tablet; Refill: 3  -     pravastatin (PRAVACHOL) 40 MG tablet; Take 1 tablet by mouth Daily.   Dispense: 90 tablet; Refill: 1  -     sildenafil (VIAGRA) 50 MG tablet; Take 1 tablet by mouth As Needed for Erectile Dysfunction.  Dispense: 30 tablet; Refill: 5  -     CBC (No Diff); Future  -     Comprehensive metabolic panel; Future  -     Lipid panel; Future  -     TSH; Future    6. Chronic kidney disease, stage 3a (HCC)  Assessment & Plan:  Renal condition is improving with treatment.  Continue current treatment regimen.  Fluid restriction.  Weight loss.  Monitor daily weight.  Regular aerobic exercise.  Continue current medications.  Renal condition will be reassessed in 6 months.    Orders:  -     FLUoxetine (PROzac) 40 MG capsule; Take 1 capsule by mouth Daily.  Dispense: 90 capsule; Refill: 3  -     Metoprolol Succinate 25 MG capsule extended-release 24 hour sprinkle; Take 25 mg by mouth 2 (Two) Times a Day.  Dispense: 180 capsule; Refill: 3  -     pantoprazole (PROTONIX) 40 MG EC tablet; Take 1 tablet by mouth Daily.  Dispense: 90 tablet; Refill: 3  -     pravastatin (PRAVACHOL) 40 MG tablet; Take 1 tablet by mouth Daily.  Dispense: 90 tablet; Refill: 1  -     sildenafil (VIAGRA) 50 MG tablet; Take 1 tablet by mouth As Needed for Erectile Dysfunction.  Dispense: 30 tablet; Refill: 5    7. Vitamin D deficiency  -     FLUoxetine (PROzac) 40 MG capsule; Take 1 capsule by mouth Daily.  Dispense: 90 capsule; Refill: 3  -     Metoprolol Succinate 25 MG capsule extended-release 24 hour sprinkle; Take 25 mg by mouth 2 (Two) Times a Day.  Dispense: 180 capsule; Refill: 3  -     pantoprazole (PROTONIX) 40 MG EC tablet; Take 1 tablet by mouth Daily.  Dispense: 90 tablet; Refill: 3  -     pravastatin (PRAVACHOL) 40 MG tablet; Take 1 tablet by mouth Daily.  Dispense: 90 tablet; Refill: 1  -     sildenafil (VIAGRA) 50 MG tablet; Take 1 tablet by mouth As Needed for Erectile Dysfunction.  Dispense: 30 tablet; Refill: 5    8. Increased prostate specific antigen (PSA) velocity  -     FLUoxetine (PROzac) 40 MG capsule;  Take 1 capsule by mouth Daily.  Dispense: 90 capsule; Refill: 3  -     Metoprolol Succinate 25 MG capsule extended-release 24 hour sprinkle; Take 25 mg by mouth 2 (Two) Times a Day.  Dispense: 180 capsule; Refill: 3  -     pantoprazole (PROTONIX) 40 MG EC tablet; Take 1 tablet by mouth Daily.  Dispense: 90 tablet; Refill: 3  -     pravastatin (PRAVACHOL) 40 MG tablet; Take 1 tablet by mouth Daily.  Dispense: 90 tablet; Refill: 1  -     sildenafil (VIAGRA) 50 MG tablet; Take 1 tablet by mouth As Needed for Erectile Dysfunction.  Dispense: 30 tablet; Refill: 5    9. Vitamin B12 deficiency  -     FLUoxetine (PROzac) 40 MG capsule; Take 1 capsule by mouth Daily.  Dispense: 90 capsule; Refill: 3  -     Metoprolol Succinate 25 MG capsule extended-release 24 hour sprinkle; Take 25 mg by mouth 2 (Two) Times a Day.  Dispense: 180 capsule; Refill: 3  -     pantoprazole (PROTONIX) 40 MG EC tablet; Take 1 tablet by mouth Daily.  Dispense: 90 tablet; Refill: 3  -     pravastatin (PRAVACHOL) 40 MG tablet; Take 1 tablet by mouth Daily.  Dispense: 90 tablet; Refill: 1  -     sildenafil (VIAGRA) 50 MG tablet; Take 1 tablet by mouth As Needed for Erectile Dysfunction.  Dispense: 30 tablet; Refill: 5    10. Screening for prostate cancer  -     FLUoxetine (PROzac) 40 MG capsule; Take 1 capsule by mouth Daily.  Dispense: 90 capsule; Refill: 3  -     Metoprolol Succinate 25 MG capsule extended-release 24 hour sprinkle; Take 25 mg by mouth 2 (Two) Times a Day.  Dispense: 180 capsule; Refill: 3  -     pantoprazole (PROTONIX) 40 MG EC tablet; Take 1 tablet by mouth Daily.  Dispense: 90 tablet; Refill: 3  -     pravastatin (PRAVACHOL) 40 MG tablet; Take 1 tablet by mouth Daily.  Dispense: 90 tablet; Refill: 1  -     sildenafil (VIAGRA) 50 MG tablet; Take 1 tablet by mouth As Needed for Erectile Dysfunction.  Dispense: 30 tablet; Refill: 5           Follow Up   Return in about 6 months (around 7/10/2023) for Recheck.  Patient was given  instructions and counseling regarding his condition or for health maintenance advice. Please see specific information pulled into the AVS if appropriate.

## 2023-10-17 DIAGNOSIS — Z87.891 HISTORY OF TOBACCO USE: ICD-10-CM

## 2023-10-17 DIAGNOSIS — E78.2 HYPERLIPIDEMIA, MIXED: ICD-10-CM

## 2023-10-17 DIAGNOSIS — E55.9 VITAMIN D DEFICIENCY: ICD-10-CM

## 2023-10-17 DIAGNOSIS — R97.20 INCREASED PROSTATE SPECIFIC ANTIGEN (PSA) VELOCITY: ICD-10-CM

## 2023-10-17 DIAGNOSIS — E53.8 VITAMIN B12 DEFICIENCY: ICD-10-CM

## 2023-10-17 DIAGNOSIS — N18.31 CHRONIC KIDNEY DISEASE, STAGE 3A: ICD-10-CM

## 2023-10-17 DIAGNOSIS — Z79.899 HIGH RISK MEDICATION USE: ICD-10-CM

## 2023-10-17 DIAGNOSIS — I10 HYPERTENSION, ESSENTIAL: ICD-10-CM

## 2023-10-17 DIAGNOSIS — Z13.6 SCREENING FOR AAA (ABDOMINAL AORTIC ANEURYSM): ICD-10-CM

## 2023-10-17 DIAGNOSIS — Z12.5 SCREENING FOR PROSTATE CANCER: ICD-10-CM

## 2023-10-17 RX ORDER — PRAVASTATIN SODIUM 40 MG
40 TABLET ORAL DAILY
Qty: 90 TABLET | Refills: 0 | Status: SHIPPED | OUTPATIENT
Start: 2023-10-17 | End: 2023-10-19 | Stop reason: SDUPTHER

## 2023-10-17 NOTE — TELEPHONE ENCOUNTER
Rx Refill Note    Requested Prescriptions     Pending Prescriptions Disp Refills    pravastatin (PRAVACHOL) 40 MG tablet [Pharmacy Med Name: Pravastatin Sodium 40 MG Oral Tablet] 90 tablet 0     Sig: Take 1 tablet by mouth once daily        Last office visit with prescribing clinician: 1/10/2023      Next office visit with prescribing clinician: 10/19/2023   Last labs:   Last refill: 01/10/2023   Pharmacy (be sure to add in Epic). correct

## 2023-10-19 ENCOUNTER — OFFICE VISIT (OUTPATIENT)
Dept: FAMILY MEDICINE CLINIC | Facility: CLINIC | Age: 68
End: 2023-10-19
Payer: MEDICARE

## 2023-10-19 VITALS
BODY MASS INDEX: 33.45 KG/M2 | WEIGHT: 269 LBS | OXYGEN SATURATION: 98 % | HEART RATE: 60 BPM | DIASTOLIC BLOOD PRESSURE: 90 MMHG | SYSTOLIC BLOOD PRESSURE: 150 MMHG | HEIGHT: 75 IN

## 2023-10-19 DIAGNOSIS — I10 HYPERTENSION, ESSENTIAL: ICD-10-CM

## 2023-10-19 DIAGNOSIS — Z12.5 SCREENING FOR PROSTATE CANCER: ICD-10-CM

## 2023-10-19 DIAGNOSIS — F33.1 MODERATE EPISODE OF RECURRENT MAJOR DEPRESSIVE DISORDER: Primary | ICD-10-CM

## 2023-10-19 DIAGNOSIS — E55.9 VITAMIN D DEFICIENCY: ICD-10-CM

## 2023-10-19 DIAGNOSIS — E78.2 HYPERLIPIDEMIA, MIXED: ICD-10-CM

## 2023-10-19 DIAGNOSIS — R97.20 INCREASED PROSTATE SPECIFIC ANTIGEN (PSA) VELOCITY: ICD-10-CM

## 2023-10-19 DIAGNOSIS — Z12.5 SPECIAL SCREENING FOR MALIGNANT NEOPLASM OF PROSTATE: ICD-10-CM

## 2023-10-19 DIAGNOSIS — Z87.891 HISTORY OF TOBACCO USE: ICD-10-CM

## 2023-10-19 DIAGNOSIS — R73.9 HYPERGLYCEMIA: ICD-10-CM

## 2023-10-19 DIAGNOSIS — N18.31 CHRONIC KIDNEY DISEASE, STAGE 3A: ICD-10-CM

## 2023-10-19 DIAGNOSIS — Z79.899 HIGH RISK MEDICATION USE: ICD-10-CM

## 2023-10-19 RX ORDER — PRAVASTATIN SODIUM 40 MG
40 TABLET ORAL DAILY
Qty: 90 TABLET | Refills: 3 | Status: SHIPPED | OUTPATIENT
Start: 2023-10-19

## 2023-10-19 RX ORDER — PANTOPRAZOLE SODIUM 40 MG/1
40 TABLET, DELAYED RELEASE ORAL DAILY
Qty: 90 TABLET | Refills: 3 | Status: SHIPPED | OUTPATIENT
Start: 2023-10-19

## 2023-10-19 RX ORDER — FLUOXETINE HYDROCHLORIDE 20 MG/1
20 CAPSULE ORAL DAILY
Qty: 30 CAPSULE | Refills: 0 | Status: SHIPPED | OUTPATIENT
Start: 2023-10-19

## 2023-10-19 RX ORDER — DESVENLAFAXINE SUCCINATE 50 MG/1
50 TABLET, EXTENDED RELEASE ORAL DAILY
Qty: 30 TABLET | Refills: 0 | Status: SHIPPED | OUTPATIENT
Start: 2023-10-19

## 2023-10-19 NOTE — PROGRESS NOTES
"Chief Complaint  Hyperlipidemia and Med Refill    Subjective        Ebenezer Che presents to [facility]  History of Present Illness   He is stable on his medications without chest pain and shortness of breath.   #2 He is having some anger issues with Prozac and has been on this for years.   Objective   Vital Signs:  /90 (BP Location: Left arm, Patient Position: Sitting, Cuff Size: Large Adult)   Pulse 60   Ht 190.5 cm (75\")   Wt 122 kg (269 lb)   SpO2 98%   BMI 33.62 kg/m²   Estimated body mass index is 33.62 kg/m² as calculated from the following:    Height as of this encounter: 190.5 cm (75\").    Weight as of this encounter: 122 kg (269 lb).             Physical Exam  Vitals and nursing note reviewed.   Constitutional:       Appearance: Normal appearance.   HENT:      Head: Normocephalic and atraumatic.      Right Ear: Tympanic membrane and ear canal normal.      Left Ear: Tympanic membrane and ear canal normal.      Nose: Nose normal.      Mouth/Throat:      Pharynx: Oropharynx is clear.   Eyes:      Extraocular Movements: Extraocular movements intact.      Conjunctiva/sclera: Conjunctivae normal.      Pupils: Pupils are equal, round, and reactive to light.   Cardiovascular:      Rate and Rhythm: Normal rate and regular rhythm.      Heart sounds: Normal heart sounds.   Pulmonary:      Effort: Pulmonary effort is normal.      Breath sounds: Normal breath sounds.   Abdominal:      General: Abdomen is flat. Bowel sounds are normal. There is no distension.      Palpations: Abdomen is soft.      Tenderness: There is no abdominal tenderness. There is no guarding or rebound.   Musculoskeletal:         General: Normal range of motion.      Cervical back: Normal range of motion and neck supple.   Skin:     General: Skin is warm and dry.   Neurological:      General: No focal deficit present.      Mental Status: He is alert and oriented to person, place, and time. Mental status is at " baseline.   Psychiatric:         Mood and Affect: Mood normal.         Behavior: Behavior normal.         Thought Content: Thought content normal.         Judgment: Judgment normal.        Result Review :      Data reviewed : Previous blood work reviewed.             Assessment and Plan   Diagnoses and all orders for this visit:    1. Moderate episode of recurrent major depressive disorder (Primary)  Assessment & Plan:  After much discussion about his anger issues and feeling down and sexual side effects, we will change Prozac to Pristiq and see if we get some improvement. We discussed how to wean down from the 40mg and begin the Pristiq. We discussed the benefits as well as side effects. I want to see him back in one month.     Orders:  -     FLUoxetine (PROzac) 20 MG capsule; Take 1 capsule by mouth Daily.  Dispense: 30 capsule; Refill: 0  -     desvenlafaxine (Pristiq) 50 MG 24 hr tablet; Take 1 tablet by mouth Daily.  Dispense: 30 tablet; Refill: 0    2. Hyperlipidemia, mixed  Assessment & Plan:  Lipid abnormalities are improving with treatment.  Pharmacotherapy as ordered.  Lipids will be reassessed in 6 months.    Orders:  -     Metoprolol Succinate 25 MG capsule extended-release 24 hour sprinkle; Take 25 mg by mouth 2 (Two) Times a Day.  Dispense: 180 capsule; Refill: 3  -     pantoprazole (PROTONIX) 40 MG EC tablet; Take 1 tablet by mouth Daily.  Dispense: 90 tablet; Refill: 3  -     pravastatin (PRAVACHOL) 40 MG tablet; Take 1 tablet by mouth Daily.  Dispense: 90 tablet; Refill: 3  -     Lipid panel; Future  -     Lipid panel    3. High risk medication use  Assessment & Plan:  Blood work drawn    Orders:  -     Metoprolol Succinate 25 MG capsule extended-release 24 hour sprinkle; Take 25 mg by mouth 2 (Two) Times a Day.  Dispense: 180 capsule; Refill: 3  -     pantoprazole (PROTONIX) 40 MG EC tablet; Take 1 tablet by mouth Daily.  Dispense: 90 tablet; Refill: 3  -     pravastatin (PRAVACHOL) 40 MG tablet;  Take 1 tablet by mouth Daily.  Dispense: 90 tablet; Refill: 3  -     CBC (No Diff); Future  -     Comprehensive metabolic panel; Future  -     TSH; Future  -     Comprehensive metabolic panel  -     TSH    4. History of tobacco use  -     Metoprolol Succinate 25 MG capsule extended-release 24 hour sprinkle; Take 25 mg by mouth 2 (Two) Times a Day.  Dispense: 180 capsule; Refill: 3  -     pantoprazole (PROTONIX) 40 MG EC tablet; Take 1 tablet by mouth Daily.  Dispense: 90 tablet; Refill: 3  -     pravastatin (PRAVACHOL) 40 MG tablet; Take 1 tablet by mouth Daily.  Dispense: 90 tablet; Refill: 3    5. Hypertension, essential  Assessment & Plan:  Hypertension is improving with treatment.  Continue current treatment regimen.  Dietary sodium restriction.  Weight loss.  Regular aerobic exercise.  Blood pressure will be reassessed at the next regular appointment.    Orders:  -     Metoprolol Succinate 25 MG capsule extended-release 24 hour sprinkle; Take 25 mg by mouth 2 (Two) Times a Day.  Dispense: 180 capsule; Refill: 3  -     pantoprazole (PROTONIX) 40 MG EC tablet; Take 1 tablet by mouth Daily.  Dispense: 90 tablet; Refill: 3  -     pravastatin (PRAVACHOL) 40 MG tablet; Take 1 tablet by mouth Daily.  Dispense: 90 tablet; Refill: 3  -     Hemoglobin A1c; Future  -     Hemoglobin A1c    6. Chronic kidney disease, stage 3a  Assessment & Plan:  Renal condition is improving with treatment.  Continue current treatment regimen.  Fluid restriction.  Weight loss.  Regular aerobic exercise.  Renal condition will be reassessed in 6 months.    Orders:  -     Metoprolol Succinate 25 MG capsule extended-release 24 hour sprinkle; Take 25 mg by mouth 2 (Two) Times a Day.  Dispense: 180 capsule; Refill: 3  -     pantoprazole (PROTONIX) 40 MG EC tablet; Take 1 tablet by mouth Daily.  Dispense: 90 tablet; Refill: 3  -     pravastatin (PRAVACHOL) 40 MG tablet; Take 1 tablet by mouth Daily.  Dispense: 90 tablet; Refill: 3    7. Vitamin D  deficiency  -     Metoprolol Succinate 25 MG capsule extended-release 24 hour sprinkle; Take 25 mg by mouth 2 (Two) Times a Day.  Dispense: 180 capsule; Refill: 3  -     pantoprazole (PROTONIX) 40 MG EC tablet; Take 1 tablet by mouth Daily.  Dispense: 90 tablet; Refill: 3  -     pravastatin (PRAVACHOL) 40 MG tablet; Take 1 tablet by mouth Daily.  Dispense: 90 tablet; Refill: 3    8. Increased prostate specific antigen (PSA) velocity  -     Metoprolol Succinate 25 MG capsule extended-release 24 hour sprinkle; Take 25 mg by mouth 2 (Two) Times a Day.  Dispense: 180 capsule; Refill: 3  -     pantoprazole (PROTONIX) 40 MG EC tablet; Take 1 tablet by mouth Daily.  Dispense: 90 tablet; Refill: 3  -     pravastatin (PRAVACHOL) 40 MG tablet; Take 1 tablet by mouth Daily.  Dispense: 90 tablet; Refill: 3    9. Screening for prostate cancer  Assessment & Plan:  PSA drawn    Orders:  -     Metoprolol Succinate 25 MG capsule extended-release 24 hour sprinkle; Take 25 mg by mouth 2 (Two) Times a Day.  Dispense: 180 capsule; Refill: 3  -     pantoprazole (PROTONIX) 40 MG EC tablet; Take 1 tablet by mouth Daily.  Dispense: 90 tablet; Refill: 3  -     pravastatin (PRAVACHOL) 40 MG tablet; Take 1 tablet by mouth Daily.  Dispense: 90 tablet; Refill: 3  -     PSA SCREENING; Future  -     PSA SCREENING    10. Special screening for malignant neoplasm of prostate  Assessment & Plan:  PSA drawn    Orders:  -     PSA SCREENING; Future  -     PSA SCREENING    11. Hyperglycemia  Assessment & Plan:  Blood work drawn, discussed diet and exercise.     Orders:  -     Hemoglobin A1c; Future  -     Hemoglobin A1c             Follow Up   Return in about 4 weeks (around 11/16/2023) for Recheck.  Patient was given instructions and counseling regarding his condition or for health maintenance advice. Please see specific information pulled into the AVS if appropriate.

## 2023-10-20 PROBLEM — F33.1 MODERATE EPISODE OF RECURRENT MAJOR DEPRESSIVE DISORDER: Status: ACTIVE | Noted: 2023-10-20

## 2023-10-20 LAB
ALBUMIN SERPL-MCNC: 4.8 G/DL (ref 3.9–4.9)
ALBUMIN/GLOB SERPL: 2.5 {RATIO} (ref 1.2–2.2)
ALP SERPL-CCNC: 48 IU/L (ref 44–121)
ALT SERPL-CCNC: 23 IU/L (ref 0–44)
AST SERPL-CCNC: 23 IU/L (ref 0–40)
BILIRUB SERPL-MCNC: 0.8 MG/DL (ref 0–1.2)
BUN SERPL-MCNC: 17 MG/DL (ref 8–27)
BUN/CREAT SERPL: 15 (ref 10–24)
CALCIUM SERPL-MCNC: 9.3 MG/DL (ref 8.6–10.2)
CHLORIDE SERPL-SCNC: 102 MMOL/L (ref 96–106)
CHOLEST SERPL-MCNC: 187 MG/DL (ref 100–199)
CO2 SERPL-SCNC: 22 MMOL/L (ref 20–29)
CREAT SERPL-MCNC: 1.11 MG/DL (ref 0.76–1.27)
EGFRCR SERPLBLD CKD-EPI 2021: 73 ML/MIN/1.73
GLOBULIN SER CALC-MCNC: 1.9 G/DL (ref 1.5–4.5)
GLUCOSE SERPL-MCNC: 93 MG/DL (ref 70–99)
HBA1C MFR BLD: 5.7 % (ref 4.8–5.6)
HDLC SERPL-MCNC: 53 MG/DL
LDLC SERPL CALC-MCNC: 108 MG/DL (ref 0–99)
POTASSIUM SERPL-SCNC: 4.6 MMOL/L (ref 3.5–5.2)
PROT SERPL-MCNC: 6.7 G/DL (ref 6–8.5)
PSA SERPL-MCNC: 2.1 NG/ML (ref 0–4)
SODIUM SERPL-SCNC: 142 MMOL/L (ref 134–144)
TRIGL SERPL-MCNC: 148 MG/DL (ref 0–149)
TSH SERPL DL<=0.005 MIU/L-ACNC: 1.7 UIU/ML (ref 0.45–4.5)
VLDLC SERPL CALC-MCNC: 26 MG/DL (ref 5–40)

## 2023-10-20 NOTE — ASSESSMENT & PLAN NOTE
After much discussion about his anger issues and feeling down and sexual side effects, we will change Prozac to Pristiq and see if we get some improvement. We discussed how to wean down from the 40mg and begin the Pristiq. We discussed the benefits as well as side effects. I want to see him back in one month.

## 2023-10-20 NOTE — ASSESSMENT & PLAN NOTE
Renal condition is improving with treatment.  Continue current treatment regimen.  Fluid restriction.  Weight loss.  Regular aerobic exercise.  Renal condition will be reassessed in 6 months.

## 2023-11-27 DIAGNOSIS — F33.1 MODERATE EPISODE OF RECURRENT MAJOR DEPRESSIVE DISORDER: ICD-10-CM

## 2023-11-27 RX ORDER — DESVENLAFAXINE SUCCINATE 50 MG/1
TABLET, EXTENDED RELEASE ORAL DAILY
Qty: 90 TABLET | Refills: 0 | Status: SHIPPED | OUTPATIENT
Start: 2023-11-27 | End: 2023-11-30

## 2023-11-30 ENCOUNTER — OFFICE VISIT (OUTPATIENT)
Dept: FAMILY MEDICINE CLINIC | Facility: CLINIC | Age: 68
End: 2023-11-30
Payer: MEDICARE

## 2023-11-30 VITALS
HEART RATE: 62 BPM | WEIGHT: 279.1 LBS | OXYGEN SATURATION: 96 % | TEMPERATURE: 98.4 F | DIASTOLIC BLOOD PRESSURE: 88 MMHG | SYSTOLIC BLOOD PRESSURE: 138 MMHG | HEIGHT: 75 IN | BODY MASS INDEX: 34.7 KG/M2

## 2023-11-30 DIAGNOSIS — F33.1 MODERATE EPISODE OF RECURRENT MAJOR DEPRESSIVE DISORDER: Primary | ICD-10-CM

## 2023-11-30 DIAGNOSIS — I10 HYPERTENSION, ESSENTIAL: ICD-10-CM

## 2023-11-30 PROCEDURE — 99214 OFFICE O/P EST MOD 30 MIN: CPT | Performed by: NURSE PRACTITIONER

## 2023-11-30 PROCEDURE — 3075F SYST BP GE 130 - 139MM HG: CPT | Performed by: NURSE PRACTITIONER

## 2023-11-30 PROCEDURE — 1159F MED LIST DOCD IN RCRD: CPT | Performed by: NURSE PRACTITIONER

## 2023-11-30 PROCEDURE — 1160F RVW MEDS BY RX/DR IN RCRD: CPT | Performed by: NURSE PRACTITIONER

## 2023-11-30 PROCEDURE — 3079F DIAST BP 80-89 MM HG: CPT | Performed by: NURSE PRACTITIONER

## 2023-11-30 RX ORDER — VALSARTAN 80 MG/1
80 TABLET ORAL DAILY
Qty: 90 TABLET | Refills: 0 | Status: SHIPPED | OUTPATIENT
Start: 2023-11-30

## 2023-11-30 RX ORDER — VENLAFAXINE HYDROCHLORIDE 75 MG/1
75 CAPSULE, EXTENDED RELEASE ORAL DAILY
Qty: 90 CAPSULE | Refills: 1 | Status: SHIPPED | OUTPATIENT
Start: 2023-11-30

## 2023-11-30 NOTE — PROGRESS NOTES
"Chief Complaint  Depression (F/u)    Subjective        Ebenezer Che presents to [facility]  Depression   We changed him from Prozac to Pristiq and he did not notice a big difference but the Pristiq is very pricey for him. Overall he liked this.  He wants to try something else.#2 HTN his b/p is slightly elevated today. He does not have any chest pain /tightness. He is taking metoprolol for this but is experiencing ED with this. He would like to try something else for this. He has been on  Lisinopril in the past.     Objective   Vital Signs:  /88 (BP Location: Left arm, Patient Position: Sitting, Cuff Size: Large Adult)   Pulse 62   Temp 98.4 °F (36.9 °C) (Temporal)   Ht 190.5 cm (75\")   Wt 127 kg (279 lb 1.6 oz)   SpO2 96%   BMI 34.89 kg/m²   Estimated body mass index is 34.89 kg/m² as calculated from the following:    Height as of this encounter: 190.5 cm (75\").    Weight as of this encounter: 127 kg (279 lb 1.6 oz).               Physical Exam  Vitals and nursing note reviewed.   Constitutional:       Appearance: Normal appearance.   HENT:      Head: Normocephalic and atraumatic.      Right Ear: Tympanic membrane and ear canal normal.      Left Ear: Tympanic membrane and ear canal normal.      Nose: Nose normal.      Mouth/Throat:      Pharynx: Oropharynx is clear.   Eyes:      Extraocular Movements: Extraocular movements intact.      Conjunctiva/sclera: Conjunctivae normal.      Pupils: Pupils are equal, round, and reactive to light.   Cardiovascular:      Rate and Rhythm: Normal rate and regular rhythm.      Heart sounds: Normal heart sounds.   Pulmonary:      Effort: Pulmonary effort is normal.      Breath sounds: Normal breath sounds.   Abdominal:      General: Abdomen is flat. Bowel sounds are normal. There is no distension.      Palpations: Abdomen is soft.      Tenderness: There is no abdominal tenderness. There is no guarding or rebound.   Musculoskeletal:         " General: Normal range of motion.      Cervical back: Normal range of motion and neck supple.   Skin:     General: Skin is warm and dry.   Neurological:      General: No focal deficit present.      Mental Status: He is alert and oriented to person, place, and time. Mental status is at baseline.   Psychiatric:         Mood and Affect: Mood normal.         Behavior: Behavior normal.         Thought Content: Thought content normal.         Judgment: Judgment normal.        Result Review :      Data reviewed : previous blood work reviewed.             Assessment and Plan   Diagnoses and all orders for this visit:    1. Moderate episode of recurrent major depressive disorder (Primary)  Assessment & Plan:  Patient's depression is recurrent and is mild without psychosis. Their depression is currently active and the condition is improving with treatment. This will be reassessed at the next regular appointment. F/U as described:patient was prescribed an antidepressant medicine. Will change from Pristiq to Effexor for cost reasons.     Orders:  -     venlafaxine XR (Effexor XR) 75 MG 24 hr capsule; Take 1 capsule by mouth Daily.  Dispense: 90 capsule; Refill: 1    2. Hypertension, essential  Assessment & Plan:  Hypertension is unchanged.  Continue current treatment regimen.  Dietary sodium restriction.  Weight loss.  Regular aerobic exercise.  Blood pressure will be reassessed at the next regular appointment.    Orders:  -     valsartan (Diovan) 80 MG tablet; Take 1 tablet by mouth Daily.  Dispense: 90 tablet; Refill: 0           I spent 30 minutes caring for Ebenezer on this date of service. This time includes time spent by me in the following activities:reviewing tests, performing a medically appropriate examination and/or evaluation , counseling and educating the patient/family/caregiver, ordering medications, tests, or procedures, and documenting information in the medical record  Follow Up   Return in about 4 weeks (around  12/28/2023) for Recheck.  Patient was given instructions and counseling regarding his condition or for health maintenance advice. Please see specific information pulled into the AVS if appropriate.

## 2023-11-30 NOTE — ASSESSMENT & PLAN NOTE
Patient's depression is recurrent and is mild without psychosis. Their depression is currently active and the condition is improving with treatment. This will be reassessed at the next regular appointment. F/U as described:patient was prescribed an antidepressant medicine. Will change from Pristiq to Effexor for cost reasons.

## 2024-01-15 ENCOUNTER — OFFICE VISIT (OUTPATIENT)
Dept: FAMILY MEDICINE CLINIC | Facility: CLINIC | Age: 69
End: 2024-01-15
Payer: MEDICARE

## 2024-01-15 VITALS
DIASTOLIC BLOOD PRESSURE: 84 MMHG | HEIGHT: 75 IN | OXYGEN SATURATION: 94 % | WEIGHT: 277.1 LBS | SYSTOLIC BLOOD PRESSURE: 150 MMHG | BODY MASS INDEX: 34.45 KG/M2 | HEART RATE: 78 BPM

## 2024-01-15 DIAGNOSIS — I10 HYPERTENSION, ESSENTIAL: Primary | ICD-10-CM

## 2024-01-15 PROCEDURE — 99214 OFFICE O/P EST MOD 30 MIN: CPT | Performed by: STUDENT IN AN ORGANIZED HEALTH CARE EDUCATION/TRAINING PROGRAM

## 2024-01-15 PROCEDURE — 3079F DIAST BP 80-89 MM HG: CPT | Performed by: STUDENT IN AN ORGANIZED HEALTH CARE EDUCATION/TRAINING PROGRAM

## 2024-01-15 PROCEDURE — 3077F SYST BP >= 140 MM HG: CPT | Performed by: STUDENT IN AN ORGANIZED HEALTH CARE EDUCATION/TRAINING PROGRAM

## 2024-01-15 PROCEDURE — 1160F RVW MEDS BY RX/DR IN RCRD: CPT | Performed by: STUDENT IN AN ORGANIZED HEALTH CARE EDUCATION/TRAINING PROGRAM

## 2024-01-15 PROCEDURE — 1159F MED LIST DOCD IN RCRD: CPT | Performed by: STUDENT IN AN ORGANIZED HEALTH CARE EDUCATION/TRAINING PROGRAM

## 2024-01-15 RX ORDER — AMLODIPINE BESYLATE 5 MG/1
5 TABLET ORAL DAILY
Qty: 90 TABLET | Refills: 0 | Status: SHIPPED | OUTPATIENT
Start: 2024-01-15

## 2024-01-15 RX ORDER — DESVENLAFAXINE SUCCINATE 50 MG/1
50 TABLET, EXTENDED RELEASE ORAL DAILY
COMMUNITY

## 2024-01-15 NOTE — PROGRESS NOTES
Office Note     Name: Ebenezer Che    : 1955     MRN: [id number]     Chief Complaint  Hypertension (Changed medicine last visit, and its not working. Went back to old HTN meds.)    Subjective     History of Present Illness:  Ebenezer Che is a 68 y.o. male who presents today for blood pressure follow up    -Changed medicine last visit, and its not working. Went back to old HTN meds which is metoprolol 25 BID.   -Denies chest pain, shortness of air, dizziness, vision changes, presyncope or syncope      Past Medical History:   Past Medical History:   Diagnosis Date    Actinic keratosis     Anxiety syndrome     BPH (benign prostatic hyperplasia)     Diverticulosis     Essential hypertension     Eye injury     BB INJURY RIGHT EYE AS TEENAGER    GERD without esophagitis     High risk medication use     DRUG THERAPY FINDING    Hx of adenomatous colonic polyps     Low vitamin B12 level     Mixed hyperlipidemia     Multiple lentigines syndrome     Prediabetes     Vitamin D deficiency        Past Surgical History:   Past Surgical History:   Procedure Laterality Date    ENDOSCOPY         Immunizations:   Immunization History   Administered Date(s) Administered    COVID-19 (MODERNA) 1st,2nd,3rd Dose Monovalent 2021, 2021, 12/15/2021    Fluad Quad 65+ 10/31/2021    Influenza, Unspecified 2019    Pneumococcal Conjugate 20-Valent (PCV20) 2022    Td (TDVAX) 2005, 2006, 2017    Tdap 2016, 2018        Medications:     Current Outpatient Medications:     desvenlafaxine (PRISTIQ) 50 MG 24 hr tablet, Take 1 tablet by mouth Daily., Disp: , Rfl:     Metoprolol Succinate 25 MG capsule extended-release 24 hour sprinkle, Take 25 mg by mouth 2 (Two) Times a Day., Disp: 180 capsule, Rfl: 3    pantoprazole (PROTONIX) 40 MG EC tablet, Take 1 tablet by mouth Daily., Disp: 90 tablet, Rfl: 3    pravastatin (PRAVACHOL) 40 MG tablet, Take 1 tablet by mouth Daily., Disp: 90 tablet, Rfl: 3     "sildenafil (VIAGRA) 50 MG tablet, Take 1 tablet by mouth As Needed for Erectile Dysfunction., Disp: 30 tablet, Rfl: 5    amLODIPine (NORVASC) 5 MG tablet, Take 1 tablet by mouth Daily., Disp: 90 tablet, Rfl: 0    Allergies:   No Known Allergies    Family History:   Family History   Family history unknown: Yes       Social History:   Social History     Socioeconomic History    Marital status:    Tobacco Use    Smoking status: Former     Packs/day: 0.25     Years: 36.00     Additional pack years: 0.00     Total pack years: 9.00     Types: Cigarettes     Start date:      Quit date:      Years since quittin.0     Passive exposure: Past    Smokeless tobacco: Never   Vaping Use    Vaping Use: Never used   Substance and Sexual Activity    Alcohol use: Not Currently     Alcohol/week: 7.0 standard drinks of alcohol     Types: 7 Shots of liquor per week    Drug use: Never    Sexual activity: Defer         Objective     Vital Signs  /84 (BP Location: Right arm, Patient Position: Sitting, Cuff Size: Large Adult)   Pulse 78   Ht 190.5 cm (75\")   Wt 126 kg (277 lb 1.6 oz)   SpO2 94%   BMI 34.64 kg/m²   Estimated body mass index is 34.64 kg/m² as calculated from the following:    Height as of this encounter: 190.5 cm (75\").    Weight as of this encounter: 126 kg (277 lb 1.6 oz).      Physical Exam  Constitutional:       General: He is not in acute distress.     Appearance: Normal appearance.   HENT:      Head: Normocephalic and atraumatic.   Eyes:      Conjunctiva/sclera: Conjunctivae normal.   Cardiovascular:      Rate and Rhythm: Normal rate and regular rhythm.      Pulses: Normal pulses.   Pulmonary:      Effort: Pulmonary effort is normal.      Breath sounds: Normal breath sounds.   Skin:     General: Skin is warm and dry.   Neurological:      Mental Status: He is alert.   Psychiatric:         Mood and Affect: Mood normal.         Behavior: Behavior normal.         Thought Content: Thought " content normal.          Assessment and Plan     Diagnoses and all orders for this visit:    1. Hypertension, essential (Primary)  -     amLODIPine (NORVASC) 5 MG tablet; Take 1 tablet by mouth Daily.  Dispense: 90 tablet; Refill: 0    Blood pressure is not at goal of less than 140/90.  He did not feel the losartan was lowering his blood pressure so he discontinued that.  Will continue his current dose of metoprolol and start amlodipine 5 mg.  He was counseled on risk and benefits of this medication.  He will continue to monitor his blood pressure and follow-up in about a month.         Follow Up  Return in about 4 weeks (around 2/12/2024) for Annual physical and blood pressure follow up.    DO CHEYENNE Lemons [facility]  [address] [place] [id number] [phone]    NOTE TO PATIENT: The 21st Century Cures Act makes medical notes like these available to patients in the interest of transparency. However, be advised this is a medical document. It is intended as peer to peer communication. It is written in medical language and may contain abbreviations or verbiage that are unfamiliar. It may appear blunt or direct. Medical documents are intended to carry relevant information, facts as evident, and the clinical opinion of the practitioner.

## 2024-01-16 DIAGNOSIS — E53.8 VITAMIN B12 DEFICIENCY: ICD-10-CM

## 2024-01-16 DIAGNOSIS — I10 HYPERTENSION, ESSENTIAL: ICD-10-CM

## 2024-01-16 DIAGNOSIS — E55.9 VITAMIN D DEFICIENCY: ICD-10-CM

## 2024-01-16 DIAGNOSIS — Z12.5 SCREENING FOR PROSTATE CANCER: ICD-10-CM

## 2024-01-16 DIAGNOSIS — Z87.891 HISTORY OF TOBACCO USE: ICD-10-CM

## 2024-01-16 DIAGNOSIS — Z79.899 HIGH RISK MEDICATION USE: ICD-10-CM

## 2024-01-16 DIAGNOSIS — R97.20 INCREASED PROSTATE SPECIFIC ANTIGEN (PSA) VELOCITY: ICD-10-CM

## 2024-01-16 DIAGNOSIS — E78.2 HYPERLIPIDEMIA, MIXED: ICD-10-CM

## 2024-01-16 DIAGNOSIS — Z13.6 SCREENING FOR AAA (ABDOMINAL AORTIC ANEURYSM): ICD-10-CM

## 2024-01-16 DIAGNOSIS — N18.31 CHRONIC KIDNEY DISEASE, STAGE 3A: ICD-10-CM

## 2024-01-17 RX ORDER — SILDENAFIL 50 MG/1
50 TABLET, FILM COATED ORAL AS NEEDED
Qty: 6 TABLET | Refills: 0 | Status: SHIPPED | OUTPATIENT
Start: 2024-01-17

## 2024-02-06 ENCOUNTER — OFFICE VISIT (OUTPATIENT)
Dept: FAMILY MEDICINE CLINIC | Facility: CLINIC | Age: 69
End: 2024-02-06
Payer: MEDICARE

## 2024-02-06 VITALS
BODY MASS INDEX: 34.44 KG/M2 | DIASTOLIC BLOOD PRESSURE: 72 MMHG | WEIGHT: 277 LBS | RESPIRATION RATE: 16 BRPM | SYSTOLIC BLOOD PRESSURE: 142 MMHG | HEART RATE: 57 BPM | HEIGHT: 75 IN | OXYGEN SATURATION: 98 %

## 2024-02-06 DIAGNOSIS — E53.8 VITAMIN B12 DEFICIENCY: ICD-10-CM

## 2024-02-06 DIAGNOSIS — R73.9 HYPERGLYCEMIA: ICD-10-CM

## 2024-02-06 DIAGNOSIS — E78.2 HYPERLIPIDEMIA, MIXED: ICD-10-CM

## 2024-02-06 DIAGNOSIS — N18.31 CHRONIC KIDNEY DISEASE, STAGE 3A: ICD-10-CM

## 2024-02-06 DIAGNOSIS — R45.86 EMOTIONAL LABILITY: ICD-10-CM

## 2024-02-06 DIAGNOSIS — Z79.899 HIGH RISK MEDICATION USE: ICD-10-CM

## 2024-02-06 DIAGNOSIS — N52.9 ERECTILE DYSFUNCTION, UNSPECIFIED ERECTILE DYSFUNCTION TYPE: ICD-10-CM

## 2024-02-06 DIAGNOSIS — Z13.6 SCREENING FOR AAA (ABDOMINAL AORTIC ANEURYSM): ICD-10-CM

## 2024-02-06 DIAGNOSIS — Z87.891 HISTORY OF TOBACCO USE: ICD-10-CM

## 2024-02-06 DIAGNOSIS — E55.9 VITAMIN D DEFICIENCY: ICD-10-CM

## 2024-02-06 DIAGNOSIS — I10 HYPERTENSION, ESSENTIAL: ICD-10-CM

## 2024-02-06 DIAGNOSIS — Z12.5 SCREENING FOR PROSTATE CANCER: ICD-10-CM

## 2024-02-06 DIAGNOSIS — E66.01 MORBID (SEVERE) OBESITY DUE TO EXCESS CALORIES: ICD-10-CM

## 2024-02-06 DIAGNOSIS — Z00.00 PHYSICAL EXAM: ICD-10-CM

## 2024-02-06 DIAGNOSIS — Z00.00 MEDICARE ANNUAL WELLNESS VISIT, SUBSEQUENT: Primary | ICD-10-CM

## 2024-02-06 DIAGNOSIS — R97.20 INCREASED PROSTATE SPECIFIC ANTIGEN (PSA) VELOCITY: ICD-10-CM

## 2024-02-06 RX ORDER — FLUOXETINE 10 MG/1
10 CAPSULE ORAL DAILY
Qty: 90 CAPSULE | Refills: 1 | Status: SHIPPED | OUTPATIENT
Start: 2024-02-06

## 2024-02-06 RX ORDER — SILDENAFIL 50 MG/1
50 TABLET, FILM COATED ORAL AS NEEDED
Qty: 30 TABLET | Refills: 1 | Status: SHIPPED | OUTPATIENT
Start: 2024-02-06

## 2024-02-06 NOTE — ASSESSMENT & PLAN NOTE
We are going to refill his Viagra.  We are going to check testosterone values twice  by a week.  Recheck in 1 month.

## 2024-02-06 NOTE — ASSESSMENT & PLAN NOTE
Patient's (Body mass index is 34.62 kg/m².) indicates that they are obese (BMI >30) with health conditions that include hypertension . Weight is worsening. BMI  is above average; BMI management plan is completed. We discussed portion control and increasing exercise    I talked about exercise and intermittent fasting..

## 2024-02-06 NOTE — ASSESSMENT & PLAN NOTE
Patient having some trouble with swings in his emotion.  Prozac can help this in the past.  He thought the Prozac caused erectile dysfunction.  We are going to take him off the Pristiq as this has not been helping and he has been having some side effects and put him back on Prozac just 10 mg instead of 20.  Recheck in 1 month to see if this is helping to desired effect of decreasing his emotional swings and not giving him erectile dysfunction.

## 2024-02-06 NOTE — PROGRESS NOTES
The ABCs of the Annual Wellness Visit  Subsequent Medicare Wellness Visit    Subjective    Ebenezer Che is a 68 y.o. male who presents for a Subsequent Medicare Wellness Visit.    The following portions of the patient's history were reviewed and   updated as appropriate: allergies, current medications, past family history, past medical history, past social history, past surgical history, and problem list.    Compared to one year ago, the patient feels his physical   health is the same.    Compared to one year ago, the patient feels his mental   health is worse.    Recent Hospitalizations:  He was not admitted to the hospital during the last year.       Current Medical Providers:  Patient Care Team:  Epifanio Goodman MD as PCP - General (Family Medicine)    Outpatient Medications Prior to Visit   Medication Sig Dispense Refill    amLODIPine (NORVASC) 5 MG tablet Take 1 tablet by mouth Daily. 90 tablet 0    Metoprolol Succinate 25 MG capsule extended-release 24 hour sprinkle Take 25 mg by mouth 2 (Two) Times a Day. 180 capsule 3    pantoprazole (PROTONIX) 40 MG EC tablet Take 1 tablet by mouth Daily. 90 tablet 3    pravastatin (PRAVACHOL) 40 MG tablet Take 1 tablet by mouth Daily. 90 tablet 3    desvenlafaxine (PRISTIQ) 50 MG 24 hr tablet Take 1 tablet by mouth Daily.      sildenafil (VIAGRA) 50 MG tablet TAKE 1 TABLET BY MOUTH AS NEEDED FOR ERECTILE DYSFUNCTION 6 tablet 0     No facility-administered medications prior to visit.       No opioid medication identified on active medication list. I have reviewed chart for other potential  high risk medication/s and harmful drug interactions in the elderly.        Aspirin is not on active medication list.  Aspirin use is not indicated based on review of current medical condition/s. Risk of harm outweighs potential benefits.  .    Patient Active Problem List   Diagnosis    Hyperlipidemia, mixed    High risk medication use    History of tobacco use    Medicare annual  "wellness visit, subsequent    Hypertension, essential    Chronic kidney disease, stage 3a    Vitamin D deficiency    Increased prostate specific antigen (PSA) velocity    Vitamin B12 deficiency    COVID-19 virus infection    Hyperglycemia    Moderate episode of recurrent major depressive disorder    Erectile dysfunction    Emotional lability    Morbid (severe) obesity due to excess calories     Advance Care Planning   Advance Care Planning     Advance Directive is not on file.  ACP discussion was held with the patient during this visit. Patient does not have an advance directive, information provided.     Objective    Vitals:    24 1327   BP: 142/72   BP Location: Left arm   Patient Position: Sitting   Cuff Size: Large Adult   Pulse: 57   Resp: 16   SpO2: 98%   Weight: 126 kg (277 lb)   Height: 190.5 cm (75\")   PainSc: 0-No pain     Estimated body mass index is 34.62 kg/m² as calculated from the following:    Height as of this encounter: 190.5 cm (75\").    Weight as of this encounter: 126 kg (277 lb).           Does the patient have evidence of cognitive impairment? No          HEALTH RISK ASSESSMENT    Smoking Status:  Social History     Tobacco Use   Smoking Status Former    Packs/day: 0.25    Years: 36.00    Additional pack years: 0.00    Total pack years: 9.00    Types: Cigarettes    Start date:     Quit date:     Years since quittin.1    Passive exposure: Past   Smokeless Tobacco Never     Alcohol Consumption:  Social History     Substance and Sexual Activity   Alcohol Use Not Currently    Alcohol/week: 7.0 standard drinks of alcohol    Types: 7 Shots of liquor per week     Fall Risk Screen:    SUPRIYA Fall Risk Assessment was completed, and patient is at LOW risk for falls.Assessment completed on:1/15/2024    Depression Screenin/6/2024     1:27 PM   PHQ-2/PHQ-9 Depression Screening   Little Interest or Pleasure in Doing Things 0-->not at all   Feeling Down, Depressed or Hopeless " 0-->not at all   PHQ-9: Brief Depression Severity Measure Score 0       Health Habits and Functional and Cognitive Screenin/6/2024     1:24 PM   Functional & Cognitive Status   Do you have difficulty preparing food and eating? No   Do you have difficulty bathing yourself, getting dressed or grooming yourself? No   Do you have difficulty using the toilet? No   Do you have difficulty moving around from place to place? No   Do you have trouble with steps or getting out of a bed or a chair? No   Current Diet Well Balanced Diet   Dental Exam Up to date   Eye Exam Up to date   Exercise (times per week) 0 times per week   Current Exercises Include Walking;Yard Work;Gardening   Do you need help using the phone?  No   Are you deaf or do you have serious difficulty hearing?  Yes   Do you need help to go to places out of walking distance? No   Do you need help shopping? No   Do you need help preparing meals?  No   Do you need help with housework?  No   Do you need help with laundry? No   Do you need help taking your medications? No   Do you need help managing money? No   Do you ever drive or ride in a car without wearing a seat belt? No   Have you felt unusual stress, anger or loneliness in the last month? No   Who do you live with? Spouse   If you need help, do you have trouble finding someone available to you? No   Do you have difficulty concentrating, remembering or making decisions? No       Age-appropriate Screening Schedule:  Refer to the list below for future screening recommendations based on patient's age, sex and/or medical conditions. Orders for these recommended tests are listed in the plan section. The patient has been provided with a written plan.    Health Maintenance   Topic Date Due    ZOSTER VACCINE (1 of 2) Never done    RSV Vaccine - Adults >60 Years or Pregnant 32-36 Weeks (1 - 1-dose 60+ series) Never done    COVID-19 Vaccine (2023-24 season) 2023    INFLUENZA VACCINE  2024  "(Originally 8/1/2023)    LIPID PANEL  10/19/2024    ANNUAL WELLNESS VISIT  02/06/2025    BMI FOLLOWUP  02/06/2025    TDAP/TD VACCINES (6 - Td or Tdap) 11/02/2028    COLORECTAL CANCER SCREENING  03/03/2030    HEPATITIS C SCREENING  Completed    Pneumococcal Vaccine 65+  Completed    AAA SCREEN (ONE-TIME)  Completed                  CMS Preventative Services Quick Reference  Risk Factors Identified During Encounter  None Identified  The above risks/problems have been discussed with the patient.  Pertinent information has been shared with the patient in the After Visit Summary.  An After Visit Summary and PPPS were made available to the patient.    Follow Up:   Next Medicare Wellness visit to be scheduled in 1 year.       Additional E&M Note during same encounter follows:  Patient has multiple medical problems which are significant and separately identifiable that require additional work above and beyond the Medicare Wellness Visit.      Chief Complaint  Medicare Wellness-subsequent and Med Refill    Subjective        HPI  Ebenezer Che is also being seen today for Wellness,physical, and anxiety         Objective   Vital Signs:  /72 (BP Location: Left arm, Patient Position: Sitting, Cuff Size: Large Adult)   Pulse 57   Resp 16   Ht 190.5 cm (75\")   Wt 126 kg (277 lb)   SpO2 98%   BMI 34.62 kg/m²     Physical Exam                    Assessment and Plan   Diagnoses and all orders for this visit:    1. Medicare annual wellness visit, subsequent (Primary)    2. Physical exam    3. Hypertension, essential  Assessment & Plan:  Blood pressure is up a little bit.  We can recheck in 1 month.    Orders:  -     sildenafil (VIAGRA) 50 MG tablet; Take 1 tablet by mouth As Needed for Erectile Dysfunction.  Dispense: 30 tablet; Refill: 1    4. Hyperglycemia    5. Hyperlipidemia, mixed  -     sildenafil (VIAGRA) 50 MG tablet; Take 1 tablet by mouth As Needed for Erectile Dysfunction.  Dispense: 30 tablet; Refill: 1    6. " Vitamin B12 deficiency  -     sildenafil (VIAGRA) 50 MG tablet; Take 1 tablet by mouth As Needed for Erectile Dysfunction.  Dispense: 30 tablet; Refill: 1    7. Vitamin D deficiency  -     sildenafil (VIAGRA) 50 MG tablet; Take 1 tablet by mouth As Needed for Erectile Dysfunction.  Dispense: 30 tablet; Refill: 1    8. High risk medication use  -     sildenafil (VIAGRA) 50 MG tablet; Take 1 tablet by mouth As Needed for Erectile Dysfunction.  Dispense: 30 tablet; Refill: 1    9. History of tobacco use  -     sildenafil (VIAGRA) 50 MG tablet; Take 1 tablet by mouth As Needed for Erectile Dysfunction.  Dispense: 30 tablet; Refill: 1    10. Screening for AAA (abdominal aortic aneurysm)  -     sildenafil (VIAGRA) 50 MG tablet; Take 1 tablet by mouth As Needed for Erectile Dysfunction.  Dispense: 30 tablet; Refill: 1    11. Chronic kidney disease, stage 3a  -     sildenafil (VIAGRA) 50 MG tablet; Take 1 tablet by mouth As Needed for Erectile Dysfunction.  Dispense: 30 tablet; Refill: 1    12. Increased prostate specific antigen (PSA) velocity  -     sildenafil (VIAGRA) 50 MG tablet; Take 1 tablet by mouth As Needed for Erectile Dysfunction.  Dispense: 30 tablet; Refill: 1    13. Screening for prostate cancer  -     sildenafil (VIAGRA) 50 MG tablet; Take 1 tablet by mouth As Needed for Erectile Dysfunction.  Dispense: 30 tablet; Refill: 1    14. Erectile dysfunction, unspecified erectile dysfunction type  Assessment & Plan:  We are going to refill his Viagra.  We are going to check testosterone values twice  by a week.  Recheck in 1 month.    Orders:  -     Testosterone, Free, Total; Future  -     Testosterone, Free, Total; Future    15. Emotional lability  Assessment & Plan:  Patient having some trouble with swings in his emotion.  Prozac can help this in the past.  He thought the Prozac caused erectile dysfunction.  We are going to take him off the Pristiq as this has not been helping and he has been having some  side effects and put him back on Prozac just 10 mg instead of 20.  Recheck in 1 month to see if this is helping to desired effect of decreasing his emotional swings and not giving him erectile dysfunction.      16. Morbid (severe) obesity due to excess calories  Assessment & Plan:  Patient's (Body mass index is 34.62 kg/m².) indicates that they are obese (BMI >30) with health conditions that include hypertension . Weight is worsening. BMI  is above average; BMI management plan is completed. We discussed portion control and increasing exercise    I talked about exercise and intermittent fasting..       Other orders  -     FLUoxetine (PROzac) 10 MG capsule; Take 1 capsule by mouth Daily.  Dispense: 90 capsule; Refill: 1           I spent 10 minutes caring for Ebenezer on this date of service. This time includes time spent by me in the following activities:preparing for the visit, reviewing tests, obtaining and/or reviewing a separately obtained history, performing a medically appropriate examination and/or evaluation , counseling and educating the patient/family/caregiver, ordering medications, tests, or procedures, documenting information in the medical record, independently interpreting results and communicating that information with the patient/family/caregiver, and care coordination  Follow Up   Return in about 1 month (around 3/6/2024) for Annual physical, Bloodwork 1 week prior to next appointment.  Patient was given instructions and counseling regarding his condition or for health maintenance advice. Please see specific information pulled into the AVS if appropriate.

## 2024-02-09 ENCOUNTER — LAB (OUTPATIENT)
Dept: FAMILY MEDICINE CLINIC | Facility: CLINIC | Age: 69
End: 2024-02-09
Payer: MEDICARE

## 2024-02-09 DIAGNOSIS — N52.9 ERECTILE DYSFUNCTION, UNSPECIFIED ERECTILE DYSFUNCTION TYPE: ICD-10-CM

## 2024-02-09 PROCEDURE — 36415 COLL VENOUS BLD VENIPUNCTURE: CPT | Performed by: FAMILY MEDICINE

## 2024-02-16 ENCOUNTER — LAB (OUTPATIENT)
Dept: FAMILY MEDICINE CLINIC | Facility: CLINIC | Age: 69
End: 2024-02-16
Payer: MEDICARE

## 2024-02-16 DIAGNOSIS — N52.9 ERECTILE DYSFUNCTION, UNSPECIFIED ERECTILE DYSFUNCTION TYPE: ICD-10-CM

## 2024-02-16 PROCEDURE — 36415 COLL VENOUS BLD VENIPUNCTURE: CPT | Performed by: FAMILY MEDICINE

## 2024-02-20 LAB
TESTOST FREE SERPL-MCNC: 8 PG/ML (ref 6.6–18.1)
TESTOST SERPL-MCNC: 255 NG/DL (ref 264–916)

## 2024-02-25 LAB
TESTOST FREE SERPL-MCNC: 8.9 PG/ML (ref 6.6–18.1)
TESTOST SERPL-MCNC: 570 NG/DL (ref 264–916)

## 2024-04-23 DIAGNOSIS — I10 HYPERTENSION, ESSENTIAL: ICD-10-CM

## 2024-04-23 RX ORDER — AMLODIPINE BESYLATE 5 MG/1
5 TABLET ORAL DAILY
Qty: 90 TABLET | Refills: 0 | Status: SHIPPED | OUTPATIENT
Start: 2024-04-23

## 2024-04-23 NOTE — TELEPHONE ENCOUNTER
Caller: Cris Che    Relationship: Emergency Contact    Best call back number: [phone]     Requested Prescriptions:   Requested Prescriptions     Pending Prescriptions Disp Refills    amLODIPine (NORVASC) 5 MG tablet 90 tablet 0     Sig: Take 1 tablet by mouth Daily.        Pharmacy where request should be sent: [facility] [address] [place] [address] [facility] [address] [phone] [facility] [phone] FX     Last office visit with prescribing clinician: 2/6/2024   Last telemedicine visit with prescribing clinician: Visit date not found   Next office visit with prescribing clinician: Visit date not found     Does the patient have less than a 3 day supply:  [x] Yes  [] No    Would you like a call back once the refill request has been completed: [] Yes [x] No    If the office needs to give you a call back, can they leave a voicemail: [] Yes [x] No    Clayton Urbano Rep   04/23/24 13:54 EDT

## 2024-10-03 ENCOUNTER — OFFICE VISIT (OUTPATIENT)
Dept: FAMILY MEDICINE CLINIC | Facility: CLINIC | Age: 69
End: 2024-10-03
Payer: MEDICARE

## 2024-10-03 VITALS
WEIGHT: 269.6 LBS | BODY MASS INDEX: 33.52 KG/M2 | SYSTOLIC BLOOD PRESSURE: 160 MMHG | RESPIRATION RATE: 16 BRPM | OXYGEN SATURATION: 96 % | DIASTOLIC BLOOD PRESSURE: 80 MMHG | HEART RATE: 78 BPM | HEIGHT: 75 IN

## 2024-10-03 DIAGNOSIS — Z13.6 SCREENING FOR AAA (ABDOMINAL AORTIC ANEURYSM): ICD-10-CM

## 2024-10-03 DIAGNOSIS — Z23 NEED FOR VIRAL IMMUNIZATION: ICD-10-CM

## 2024-10-03 DIAGNOSIS — R20.2 NUMBNESS AND TINGLING OF RIGHT ARM: Primary | ICD-10-CM

## 2024-10-03 DIAGNOSIS — Z12.5 SCREENING FOR PROSTATE CANCER: ICD-10-CM

## 2024-10-03 DIAGNOSIS — Z87.891 HISTORY OF TOBACCO USE: ICD-10-CM

## 2024-10-03 DIAGNOSIS — R97.20 INCREASED PROSTATE SPECIFIC ANTIGEN (PSA) VELOCITY: ICD-10-CM

## 2024-10-03 DIAGNOSIS — E53.8 VITAMIN B12 DEFICIENCY: ICD-10-CM

## 2024-10-03 DIAGNOSIS — E55.9 VITAMIN D DEFICIENCY: ICD-10-CM

## 2024-10-03 DIAGNOSIS — N18.31 CHRONIC KIDNEY DISEASE, STAGE 3A: ICD-10-CM

## 2024-10-03 DIAGNOSIS — Z79.899 HIGH RISK MEDICATION USE: ICD-10-CM

## 2024-10-03 DIAGNOSIS — E78.2 HYPERLIPIDEMIA, MIXED: ICD-10-CM

## 2024-10-03 DIAGNOSIS — I10 HYPERTENSION, ESSENTIAL: ICD-10-CM

## 2024-10-03 DIAGNOSIS — R20.0 NUMBNESS AND TINGLING OF RIGHT ARM: Primary | ICD-10-CM

## 2024-10-03 PROCEDURE — 99214 OFFICE O/P EST MOD 30 MIN: CPT | Performed by: FAMILY MEDICINE

## 2024-10-03 PROCEDURE — 3077F SYST BP >= 140 MM HG: CPT | Performed by: FAMILY MEDICINE

## 2024-10-03 PROCEDURE — 90662 IIV NO PRSV INCREASED AG IM: CPT | Performed by: FAMILY MEDICINE

## 2024-10-03 PROCEDURE — 3079F DIAST BP 80-89 MM HG: CPT | Performed by: FAMILY MEDICINE

## 2024-10-03 PROCEDURE — G0008 ADMIN INFLUENZA VIRUS VAC: HCPCS | Performed by: FAMILY MEDICINE

## 2024-10-03 PROCEDURE — 1126F AMNT PAIN NOTED NONE PRSNT: CPT | Performed by: FAMILY MEDICINE

## 2024-10-03 RX ORDER — FLUOXETINE 40 MG/1
40 CAPSULE ORAL DAILY
COMMUNITY
End: 2024-10-03 | Stop reason: SDUPTHER

## 2024-10-03 RX ORDER — SILDENAFIL 50 MG/1
50 TABLET, FILM COATED ORAL AS NEEDED
Qty: 30 TABLET | Refills: 1 | Status: SHIPPED | OUTPATIENT
Start: 2024-10-03

## 2024-10-03 RX ORDER — FLUOXETINE 40 MG/1
40 CAPSULE ORAL DAILY
Qty: 90 CAPSULE | Refills: 1 | Status: SHIPPED | OUTPATIENT
Start: 2024-10-03

## 2024-10-03 RX ORDER — PRAVASTATIN SODIUM 40 MG
40 TABLET ORAL DAILY
Qty: 90 TABLET | Refills: 1 | Status: SHIPPED | OUTPATIENT
Start: 2024-10-03

## 2024-10-03 RX ORDER — AMLODIPINE BESYLATE 5 MG/1
5 TABLET ORAL DAILY
Qty: 90 TABLET | Refills: 1 | Status: SHIPPED | OUTPATIENT
Start: 2024-10-03

## 2024-10-03 NOTE — ASSESSMENT & PLAN NOTE
Discussed with patient to monitor their blood pressure and if systolic blood pressure goes above 140 or diastolic is above 90 to return to clinic.  Take medicines as directed, call for any problems, patient not having or any worrisome symptoms.    Continue to monitor his blood pressure once a day

## 2024-10-03 NOTE — PROGRESS NOTES
Patient Name: Ebenezer Che  : 1955   MRN: [id number]     Chief Complaint:    Chief Complaint   Patient presents with    RT Shoulder Pain     Onset 4 weeks ago patient states the feels tingling in his pointer finger     Med Refill       History of Present Illness: Ebenezer Che is a 69 y.o. male who is here today for follow up on BP and right arm numbness  HPI        Review of Systems:   Review of Systems   Constitutional: Negative.    HENT: Negative.     Eyes: Negative.    Respiratory: Negative.     Cardiovascular: Negative.    Gastrointestinal: Negative.    Neurological: Negative.  Positive for numbness.        Past Medical History:   Past Medical History:   Diagnosis Date    Actinic keratosis     Anxiety syndrome     BPH (benign prostatic hyperplasia)     Diverticulosis     Essential hypertension     Eye injury     BB INJURY RIGHT EYE AS TEENAGER    GERD without esophagitis     High risk medication use     DRUG THERAPY FINDING    Hx of adenomatous colonic polyps     Low vitamin B12 level     Mixed hyperlipidemia     Multiple lentigines syndrome     Prediabetes     Vitamin D deficiency        Past Surgical History:   Past Surgical History:   Procedure Laterality Date    ENDOSCOPY         Family History:   Family History   Family history unknown: Yes       Social History:   Social History     Socioeconomic History    Marital status:    Tobacco Use    Smoking status: Former     Current packs/day: 0.00     Average packs/day: 0.3 packs/day for 36.0 years (9.0 ttl pk-yrs)     Types: Cigarettes     Start date:      Quit date:      Years since quittin.7     Passive exposure: Past    Smokeless tobacco: Never   Vaping Use    Vaping status: Never Used   Substance and Sexual Activity    Alcohol use: Not Currently     Alcohol/week: 7.0 standard drinks of alcohol     Types: 7 Shots of liquor per week    Drug use: Never    Sexual activity: Defer       Medications:     Current Outpatient Medications:  "    amLODIPine (NORVASC) 5 MG tablet, Take 1 tablet by mouth Daily., Disp: 90 tablet, Rfl: 1    FLUoxetine (PROzac) 40 MG capsule, Take 1 capsule by mouth Daily., Disp: 90 capsule, Rfl: 1    Metoprolol Succinate 25 MG capsule extended-release 24 hour sprinkle, Take 25 mg by mouth 2 (Two) Times a Day., Disp: 180 capsule, Rfl: 1    pravastatin (PRAVACHOL) 40 MG tablet, Take 1 tablet by mouth Daily., Disp: 90 tablet, Rfl: 1    sildenafil (VIAGRA) 50 MG tablet, Take 1 tablet by mouth As Needed for Erectile Dysfunction., Disp: 30 tablet, Rfl: 1    Allergies:   No Known Allergies      Physical Exam:  Vital Signs:   Vitals:    10/03/24 0921   BP: 160/80   BP Location: Left arm   Patient Position: Sitting   Cuff Size: Adult   Pulse: 78   Resp: 16   SpO2: 96%   Weight: 122 kg (269 lb 9.6 oz)   Height: 190.5 cm (75\")   PainSc: 0-No pain     Body mass index is 33.7 kg/m².     Physical Exam  Vitals and nursing note reviewed.   Constitutional:       Appearance: Normal appearance. He is normal weight.   HENT:      Head: Normocephalic and atraumatic.      Right Ear: Tympanic membrane, ear canal and external ear normal.      Left Ear: Tympanic membrane, ear canal and external ear normal.      Nose: Nose normal.      Mouth/Throat:      Mouth: Mucous membranes are dry.      Pharynx: Oropharynx is clear.   Eyes:      Extraocular Movements: Extraocular movements intact.      Conjunctiva/sclera: Conjunctivae normal.      Pupils: Pupils are equal, round, and reactive to light.   Cardiovascular:      Rate and Rhythm: Normal rate and regular rhythm.      Pulses: Normal pulses.      Heart sounds: Normal heart sounds.   Pulmonary:      Effort: Pulmonary effort is normal.      Breath sounds: Normal breath sounds.   Musculoskeletal:      Cervical back: Normal range of motion and neck supple.   Feet:      Comments:      Neurological:      Mental Status: He is alert.         Procedures      Assessment/Plan:   Diagnoses and all orders for this " visit:    1. 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  We are going to x-ray his neck.  I told him if this does not get better a few weeks or if he wants to let me know and we will send him to physical therapy.    Orders:  -     Comprehensive Metabolic Panel; Future  -     Hemoglobin A1c; Future  -     Lipid Panel; Future  -     CBC & Differential; Future  -     Vitamin B12; Future  -     Vitamin D,25-Hydroxy; Future  -     TSH Rfx On Abnormal To Free T4; Future  -     XR Spine Cervical 2 or 3 View; Future    2. Hypertension, essential  Assessment & Plan:  Discussed with patient to monitor their blood pressure and if systolic blood pressure goes above 140 or diastolic is above 90 to return to clinic.  Take medicines as directed, call for any problems, patient not having or any worrisome symptoms.    Continue to monitor his blood pressure once a day    Orders:  -     amLODIPine (NORVASC) 5 MG tablet; Take 1 tablet by mouth Daily.  Dispense: 90 tablet; Refill: 1  -     Metoprolol Succinate 25 MG capsule extended-release 24 hour sprinkle; Take 25 mg by mouth 2 (Two) Times a Day.  Dispense: 180 capsule; Refill: 1  -     pravastatin (PRAVACHOL) 40 MG tablet; Take 1 tablet by mouth Daily.  Dispense: 90 tablet; Refill: 1  -     sildenafil (VIAGRA) 50 MG tablet; Take 1 tablet by mouth As Needed for Erectile Dysfunction.  Dispense: 30 tablet; Refill: 1  -     Comprehensive Metabolic Panel; Future  -     Hemoglobin A1c; Future  -     Lipid Panel; Future  -     CBC & Differential; Future  -     Vitamin B12; Future  -     Vitamin D,25-Hydroxy; Future  -     TSH Rfx On Abnormal To Free T4; Future    3. Hyperlipidemia, mixed  -     Metoprolol Succinate 25 MG capsule extended-release 24 hour sprinkle; Take 25 mg by mouth 2 (Two) Times a Day.  Dispense: 180 capsule; Refill: 1  -     pravastatin (PRAVACHOL) 40 MG tablet; Take 1 tablet by mouth Daily.   Dispense: 90 tablet; Refill: 1  -     sildenafil (VIAGRA) 50 MG tablet; Take 1 tablet by mouth As Needed for Erectile Dysfunction.  Dispense: 30 tablet; Refill: 1  -     Comprehensive Metabolic Panel; Future  -     Hemoglobin A1c; Future  -     Lipid Panel; Future  -     CBC & Differential; Future  -     Vitamin B12; Future  -     Vitamin D,25-Hydroxy; Future  -     TSH Rfx On Abnormal To Free T4; Future    4. High risk medication use  -     Metoprolol Succinate 25 MG capsule extended-release 24 hour sprinkle; Take 25 mg by mouth 2 (Two) Times a Day.  Dispense: 180 capsule; Refill: 1  -     pravastatin (PRAVACHOL) 40 MG tablet; Take 1 tablet by mouth Daily.  Dispense: 90 tablet; Refill: 1  -     sildenafil (VIAGRA) 50 MG tablet; Take 1 tablet by mouth As Needed for Erectile Dysfunction.  Dispense: 30 tablet; Refill: 1  -     Comprehensive Metabolic Panel; Future  -     Hemoglobin A1c; Future  -     Lipid Panel; Future  -     CBC & Differential; Future  -     Vitamin B12; Future  -     Vitamin D,25-Hydroxy; Future  -     TSH Rfx On Abnormal To Free T4; Future    5. History of tobacco use  -     Metoprolol Succinate 25 MG capsule extended-release 24 hour sprinkle; Take 25 mg by mouth 2 (Two) Times a Day.  Dispense: 180 capsule; Refill: 1  -     pravastatin (PRAVACHOL) 40 MG tablet; Take 1 tablet by mouth Daily.  Dispense: 90 tablet; Refill: 1  -     sildenafil (VIAGRA) 50 MG tablet; Take 1 tablet by mouth As Needed for Erectile Dysfunction.  Dispense: 30 tablet; Refill: 1  -     Comprehensive Metabolic Panel; Future  -     Hemoglobin A1c; Future  -     Lipid Panel; Future  -     CBC & Differential; Future  -     Vitamin B12; Future  -     Vitamin D,25-Hydroxy; Future  -     TSH Rfx On Abnormal To Free T4; Future    6. Chronic kidney disease, stage 3a  -     Metoprolol Succinate 25 MG capsule extended-release 24 hour sprinkle; Take 25 mg by mouth 2 (Two) Times a Day.  Dispense: 180 capsule; Refill: 1  -     pravastatin  (PRAVACHOL) 40 MG tablet; Take 1 tablet by mouth Daily.  Dispense: 90 tablet; Refill: 1  -     sildenafil (VIAGRA) 50 MG tablet; Take 1 tablet by mouth As Needed for Erectile Dysfunction.  Dispense: 30 tablet; Refill: 1  -     Comprehensive Metabolic Panel; Future  -     Hemoglobin A1c; Future  -     Lipid Panel; Future  -     CBC & Differential; Future  -     Vitamin B12; Future  -     Vitamin D,25-Hydroxy; Future  -     TSH Rfx On Abnormal To Free T4; Future    7. Vitamin D deficiency  -     Metoprolol Succinate 25 MG capsule extended-release 24 hour sprinkle; Take 25 mg by mouth 2 (Two) Times a Day.  Dispense: 180 capsule; Refill: 1  -     pravastatin (PRAVACHOL) 40 MG tablet; Take 1 tablet by mouth Daily.  Dispense: 90 tablet; Refill: 1  -     sildenafil (VIAGRA) 50 MG tablet; Take 1 tablet by mouth As Needed for Erectile Dysfunction.  Dispense: 30 tablet; Refill: 1  -     Comprehensive Metabolic Panel; Future  -     Hemoglobin A1c; Future  -     Lipid Panel; Future  -     CBC & Differential; Future  -     Vitamin B12; Future  -     Vitamin D,25-Hydroxy; Future  -     TSH Rfx On Abnormal To Free T4; Future    8. Increased prostate specific antigen (PSA) velocity  -     Metoprolol Succinate 25 MG capsule extended-release 24 hour sprinkle; Take 25 mg by mouth 2 (Two) Times a Day.  Dispense: 180 capsule; Refill: 1  -     pravastatin (PRAVACHOL) 40 MG tablet; Take 1 tablet by mouth Daily.  Dispense: 90 tablet; Refill: 1  -     sildenafil (VIAGRA) 50 MG tablet; Take 1 tablet by mouth As Needed for Erectile Dysfunction.  Dispense: 30 tablet; Refill: 1  -     Comprehensive Metabolic Panel; Future  -     Hemoglobin A1c; Future  -     Lipid Panel; Future  -     CBC & Differential; Future  -     Vitamin B12; Future  -     Vitamin D,25-Hydroxy; Future  -     TSH Rfx On Abnormal To Free T4; Future    9. Screening for prostate cancer  -     Metoprolol Succinate 25 MG capsule extended-release 24 hour sprinkle; Take 25 mg by  mouth 2 (Two) Times a Day.  Dispense: 180 capsule; Refill: 1  -     pravastatin (PRAVACHOL) 40 MG tablet; Take 1 tablet by mouth Daily.  Dispense: 90 tablet; Refill: 1  -     sildenafil (VIAGRA) 50 MG tablet; Take 1 tablet by mouth As Needed for Erectile Dysfunction.  Dispense: 30 tablet; Refill: 1  -     Comprehensive Metabolic Panel; Future  -     Hemoglobin A1c; Future  -     Lipid Panel; Future  -     CBC & Differential; Future  -     Vitamin B12; Future  -     Vitamin D,25-Hydroxy; Future  -     TSH Rfx On Abnormal To Free T4; Future    10. Vitamin B12 deficiency  -     sildenafil (VIAGRA) 50 MG tablet; Take 1 tablet by mouth As Needed for Erectile Dysfunction.  Dispense: 30 tablet; Refill: 1  -     Comprehensive Metabolic Panel; Future  -     Hemoglobin A1c; Future  -     Lipid Panel; Future  -     CBC & Differential; Future  -     Vitamin B12; Future  -     Vitamin D,25-Hydroxy; Future  -     TSH Rfx On Abnormal To Free T4; Future    11. Screening for AAA (abdominal aortic aneurysm)  -     sildenafil (VIAGRA) 50 MG tablet; Take 1 tablet by mouth As Needed for Erectile Dysfunction.  Dispense: 30 tablet; Refill: 1  -     Comprehensive Metabolic Panel; Future  -     Hemoglobin A1c; Future  -     Lipid Panel; Future  -     CBC & Differential; Future  -     Vitamin B12; Future  -     Vitamin D,25-Hydroxy; Future  -     TSH Rfx On Abnormal To Free T4; Future    Other orders  -     FLUoxetine (PROzac) 40 MG capsule; Take 1 capsule by mouth Daily.  Dispense: 90 capsule; Refill: 1  -     Fluzone High-Dose 65+yrs             Follow Up:   Return in about 6 months (around 4/3/2025) for Annual physical, Bloodwork 1 week prior to next appointment.      Epifanio Goodman MD  [facility]

## 2024-10-03 NOTE — ASSESSMENT & PLAN NOTE
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  We are going to x-ray his neck.  I told him if this does not get better a few weeks or if he wants to let me know and we will send him to physical therapy.

## 2024-10-04 LAB
25(OH)D3+25(OH)D2 SERPL-MCNC: 78.5 NG/ML (ref 30–100)
ALBUMIN SERPL-MCNC: 4.6 G/DL (ref 3.9–4.9)
ALP SERPL-CCNC: 52 IU/L (ref 44–121)
ALT SERPL-CCNC: 19 IU/L (ref 0–44)
AST SERPL-CCNC: 17 IU/L (ref 0–40)
BASOPHILS # BLD AUTO: 0.1 X10E3/UL (ref 0–0.2)
BASOPHILS NFR BLD AUTO: 1 %
BILIRUB SERPL-MCNC: 0.5 MG/DL (ref 0–1.2)
BUN SERPL-MCNC: 19 MG/DL (ref 8–27)
BUN/CREAT SERPL: 16 (ref 10–24)
CALCIUM SERPL-MCNC: 9.2 MG/DL (ref 8.6–10.2)
CHLORIDE SERPL-SCNC: 103 MMOL/L (ref 96–106)
CHOLEST SERPL-MCNC: 191 MG/DL (ref 100–199)
CO2 SERPL-SCNC: 23 MMOL/L (ref 20–29)
CREAT SERPL-MCNC: 1.17 MG/DL (ref 0.76–1.27)
EGFRCR SERPLBLD CKD-EPI 2021: 68 ML/MIN/1.73
EOSINOPHIL # BLD AUTO: 0.3 X10E3/UL (ref 0–0.4)
EOSINOPHIL NFR BLD AUTO: 4 %
ERYTHROCYTE [DISTWIDTH] IN BLOOD BY AUTOMATED COUNT: 13 % (ref 11.6–15.4)
GLOBULIN SER CALC-MCNC: 2.2 G/DL (ref 1.5–4.5)
GLUCOSE SERPL-MCNC: 94 MG/DL (ref 70–99)
HBA1C MFR BLD: 6.1 % (ref 4.8–5.6)
HCT VFR BLD AUTO: 43 % (ref 37.5–51)
HDLC SERPL-MCNC: 47 MG/DL
HGB BLD-MCNC: 14.3 G/DL (ref 13–17.7)
IMM GRANULOCYTES # BLD AUTO: 0 X10E3/UL (ref 0–0.1)
IMM GRANULOCYTES NFR BLD AUTO: 0 %
LDLC SERPL CALC-MCNC: 122 MG/DL (ref 0–99)
LYMPHOCYTES # BLD AUTO: 2.4 X10E3/UL (ref 0.7–3.1)
LYMPHOCYTES NFR BLD AUTO: 33 %
MCH RBC QN AUTO: 30.4 PG (ref 26.6–33)
MCHC RBC AUTO-ENTMCNC: 33.3 G/DL (ref 31.5–35.7)
MCV RBC AUTO: 91 FL (ref 79–97)
MONOCYTES # BLD AUTO: 0.7 X10E3/UL (ref 0.1–0.9)
MONOCYTES NFR BLD AUTO: 9 %
NEUTROPHILS # BLD AUTO: 3.8 X10E3/UL (ref 1.4–7)
NEUTROPHILS NFR BLD AUTO: 53 %
PLATELET # BLD AUTO: 262 X10E3/UL (ref 150–450)
POTASSIUM SERPL-SCNC: 4.7 MMOL/L (ref 3.5–5.2)
PROT SERPL-MCNC: 6.8 G/DL (ref 6–8.5)
RBC # BLD AUTO: 4.71 X10E6/UL (ref 4.14–5.8)
SODIUM SERPL-SCNC: 140 MMOL/L (ref 134–144)
TRIGL SERPL-MCNC: 120 MG/DL (ref 0–149)
TSH SERPL DL<=0.005 MIU/L-ACNC: 1.4 UIU/ML (ref 0.45–4.5)
VIT B12 SERPL-MCNC: 298 PG/ML (ref 232–1245)
VLDLC SERPL CALC-MCNC: 22 MG/DL (ref 5–40)
WBC # BLD AUTO: 7.1 X10E3/UL (ref 3.4–10.8)

## 2024-11-22 DIAGNOSIS — Z79.899 HIGH RISK MEDICATION USE: ICD-10-CM

## 2024-11-22 DIAGNOSIS — I10 HYPERTENSION, ESSENTIAL: ICD-10-CM

## 2024-11-22 DIAGNOSIS — E78.2 HYPERLIPIDEMIA, MIXED: ICD-10-CM

## 2024-11-22 DIAGNOSIS — Z12.5 SCREENING FOR PROSTATE CANCER: ICD-10-CM

## 2024-11-22 DIAGNOSIS — R97.20 INCREASED PROSTATE SPECIFIC ANTIGEN (PSA) VELOCITY: ICD-10-CM

## 2024-11-22 DIAGNOSIS — Z87.891 HISTORY OF TOBACCO USE: ICD-10-CM

## 2024-11-22 DIAGNOSIS — E55.9 VITAMIN D DEFICIENCY: ICD-10-CM

## 2024-11-22 DIAGNOSIS — N18.31 CHRONIC KIDNEY DISEASE, STAGE 3A: ICD-10-CM

## 2024-11-22 RX ORDER — PANTOPRAZOLE SODIUM 40 MG/1
40 TABLET, DELAYED RELEASE ORAL DAILY
Qty: 90 TABLET | Refills: 0 | OUTPATIENT
Start: 2024-11-22

## 2024-11-22 RX ORDER — PANTOPRAZOLE SODIUM 40 MG/1
40 TABLET, DELAYED RELEASE ORAL DAILY
Qty: 90 TABLET | Refills: 3 | OUTPATIENT
Start: 2024-11-22

## 2024-11-22 NOTE — TELEPHONE ENCOUNTER
Caller: Ebenezer Che    Relationship: Self    Best call back number: [phone]     Requested Prescriptions:   Requested Prescriptions     Pending Prescriptions Disp Refills    pantoprazole (PROTONIX) 40 MG EC tablet 90 tablet 3     Sig: Take 1 tablet by mouth Daily.        Pharmacy where request should be sent: [facility] [address] [place] [address] [phone] [phone] FX     Last office visit with prescribing clinician: 10/3/2024   Last telemedicine visit with prescribing clinician: Visit date not found   Next office visit with prescribing clinician: Visit date not found     Additional details provided by patient: PATIENT STATES HE HAS 2 DAYS REMAINING OF THIS MEDICATION      Does the patient have less than a 3 day supply:  [x] Yes  [] No    Would you like a call back once the refill request has been completed: [] Yes [x] No    If the office needs to give you a call back, can they leave a voicemail: [] Yes [x] No    Clayton Ford Rep   11/22/24 13:21 EST

## 2024-11-27 ENCOUNTER — TELEPHONE (OUTPATIENT)
Dept: FAMILY MEDICINE CLINIC | Facility: CLINIC | Age: 69
End: 2024-11-27
Payer: MEDICARE

## 2024-11-27 DIAGNOSIS — Z79.899 HIGH RISK MEDICATION USE: ICD-10-CM

## 2024-11-27 DIAGNOSIS — Z12.5 SCREENING FOR PROSTATE CANCER: ICD-10-CM

## 2024-11-27 DIAGNOSIS — E55.9 VITAMIN D DEFICIENCY: ICD-10-CM

## 2024-11-27 DIAGNOSIS — I10 HYPERTENSION, ESSENTIAL: ICD-10-CM

## 2024-11-27 DIAGNOSIS — R97.20 INCREASED PROSTATE SPECIFIC ANTIGEN (PSA) VELOCITY: ICD-10-CM

## 2024-11-27 DIAGNOSIS — N18.31 CHRONIC KIDNEY DISEASE, STAGE 3A: ICD-10-CM

## 2024-11-27 DIAGNOSIS — Z87.891 HISTORY OF TOBACCO USE: ICD-10-CM

## 2024-11-27 DIAGNOSIS — E78.2 HYPERLIPIDEMIA, MIXED: ICD-10-CM

## 2024-11-27 RX ORDER — PRAVASTATIN SODIUM 40 MG
40 TABLET ORAL DAILY
Qty: 90 TABLET | Refills: 0 | Status: SHIPPED | OUTPATIENT
Start: 2024-11-27

## 2024-11-27 RX ORDER — PANTOPRAZOLE SODIUM 40 MG/1
40 TABLET, DELAYED RELEASE ORAL DAILY
Qty: 90 TABLET | Refills: 0 | Status: SHIPPED | OUTPATIENT
Start: 2024-11-27

## 2024-11-27 NOTE — TELEPHONE ENCOUNTER
Caller: Ebenezer Che    Relationship: Self    Best call back number: [phone]      What is the best time to reach you: ANYTIME    Who are you requesting to speak with (clinical staff, provider,  specific staff member): CLINICAL      What was the call regarding: PATIENT SUBMITTED A REFILL REQUEST FOR HIS pravastatin (PRAVACHOL) 40 MG tablet ON 11/22/2024 AND HAS NOT HEARD ANYTHING BACK ABOUT THIS. PATIENT HAS CALLED SEVERAL TIMES ABOUT THIS MEDICATION. HE HAS BEEN OUT OF THIS FOR SEVERAL DAYS NOW. PLEASE CALL THE PATIENT BACK WITH ANY QUESTIONS.

## 2025-03-04 DIAGNOSIS — R97.20 INCREASED PROSTATE SPECIFIC ANTIGEN (PSA) VELOCITY: ICD-10-CM

## 2025-03-04 DIAGNOSIS — Z87.891 HISTORY OF TOBACCO USE: ICD-10-CM

## 2025-03-04 DIAGNOSIS — N18.31 CHRONIC KIDNEY DISEASE, STAGE 3A: ICD-10-CM

## 2025-03-04 DIAGNOSIS — I10 HYPERTENSION, ESSENTIAL: ICD-10-CM

## 2025-03-04 DIAGNOSIS — E55.9 VITAMIN D DEFICIENCY: ICD-10-CM

## 2025-03-04 DIAGNOSIS — E78.2 HYPERLIPIDEMIA, MIXED: ICD-10-CM

## 2025-03-04 DIAGNOSIS — Z79.899 HIGH RISK MEDICATION USE: ICD-10-CM

## 2025-03-04 DIAGNOSIS — Z12.5 SCREENING FOR PROSTATE CANCER: ICD-10-CM

## 2025-03-04 RX ORDER — PANTOPRAZOLE SODIUM 40 MG/1
40 TABLET, DELAYED RELEASE ORAL DAILY
Qty: 90 TABLET | Refills: 0 | Status: SHIPPED | OUTPATIENT
Start: 2025-03-04

## 2025-03-04 NOTE — TELEPHONE ENCOUNTER
Rx Refill Note    Requested Prescriptions     Pending Prescriptions Disp Refills    pantoprazole (PROTONIX) 40 MG EC tablet [Pharmacy Med Name: Pantoprazole Sodium 40 MG Oral Tablet Delayed Release] 90 tablet 0     Sig: Take 1 tablet by mouth once daily        Last office visit with prescribing clinician: 11/30/2023      Next office visit with prescribing clinician: Visit date not found   Last labs:   Last refill: 11/27/2024   Pharmacy (be sure to add in Epic). correct

## 2025-03-28 DIAGNOSIS — I10 HYPERTENSION, ESSENTIAL: ICD-10-CM

## 2025-03-28 RX ORDER — FLUOXETINE HYDROCHLORIDE 40 MG/1
40 CAPSULE ORAL DAILY
Qty: 30 CAPSULE | Refills: 0 | Status: SHIPPED | OUTPATIENT
Start: 2025-03-28

## 2025-03-28 RX ORDER — AMLODIPINE BESYLATE 5 MG/1
5 TABLET ORAL DAILY
Qty: 30 TABLET | Refills: 0 | Status: SHIPPED | OUTPATIENT
Start: 2025-03-28

## 2025-03-28 NOTE — ASSESSMENT & PLAN NOTE
Renal condition is improving with treatment.  Continue current treatment regimen.  Fluid restriction.  Weight loss.  Monitor daily weight.  Regular aerobic exercise.  Continue current medications.  Renal condition will be reassessed in 6 months.   [id number]

## 2025-04-22 DIAGNOSIS — I10 HYPERTENSION, ESSENTIAL: ICD-10-CM

## 2025-04-22 RX ORDER — FLUOXETINE HYDROCHLORIDE 40 MG/1
40 CAPSULE ORAL DAILY
Qty: 30 CAPSULE | Refills: 0 | Status: SHIPPED | OUTPATIENT
Start: 2025-04-22

## 2025-04-22 RX ORDER — AMLODIPINE BESYLATE 5 MG/1
5 TABLET ORAL DAILY
Qty: 30 TABLET | Refills: 0 | Status: SHIPPED | OUTPATIENT
Start: 2025-04-22

## 2025-05-18 DIAGNOSIS — I10 HYPERTENSION, ESSENTIAL: ICD-10-CM

## 2025-05-19 RX ORDER — FLUOXETINE HYDROCHLORIDE 40 MG/1
40 CAPSULE ORAL DAILY
Qty: 90 CAPSULE | Refills: 1 | Status: SHIPPED | OUTPATIENT
Start: 2025-05-19

## 2025-05-19 RX ORDER — AMLODIPINE BESYLATE 5 MG/1
5 TABLET ORAL DAILY
Qty: 90 TABLET | Refills: 1 | Status: SHIPPED | OUTPATIENT
Start: 2025-05-19

## 2025-05-30 ENCOUNTER — READMISSION MANAGEMENT (OUTPATIENT)
Dept: CALL CENTER | Facility: HOSPITAL | Age: 70
End: 2025-05-30
Payer: MEDICARE

## 2025-05-30 NOTE — OUTREACH NOTE
Prep Survey      Flowsheet Row Responses   [facility] facility patient discharged from? Non-[facility]   Is LACE score < 7 ? Non-BH Discharge   Eligibility [facility]   Date of Discharge 05/30/25   Discharge Disposition Home or Self Care   Discharge diagnosis ACUTE CHOLECYSTITIS   Does the patient have one of the following disease processes/diagnoses(primary or secondary)? Other   Prep survey completed? Yes            Elke FUENTES - Registered Nurse

## 2025-06-01 DIAGNOSIS — R97.20 INCREASED PROSTATE SPECIFIC ANTIGEN (PSA) VELOCITY: ICD-10-CM

## 2025-06-01 DIAGNOSIS — E78.2 HYPERLIPIDEMIA, MIXED: ICD-10-CM

## 2025-06-01 DIAGNOSIS — Z12.5 SCREENING FOR PROSTATE CANCER: ICD-10-CM

## 2025-06-01 DIAGNOSIS — Z87.891 HISTORY OF TOBACCO USE: ICD-10-CM

## 2025-06-01 DIAGNOSIS — N18.31 CHRONIC KIDNEY DISEASE, STAGE 3A: ICD-10-CM

## 2025-06-01 DIAGNOSIS — E55.9 VITAMIN D DEFICIENCY: ICD-10-CM

## 2025-06-01 DIAGNOSIS — Z79.899 HIGH RISK MEDICATION USE: ICD-10-CM

## 2025-06-01 DIAGNOSIS — I10 HYPERTENSION, ESSENTIAL: ICD-10-CM

## 2025-06-02 ENCOUNTER — TELEPHONE (OUTPATIENT)
Dept: FAMILY MEDICINE CLINIC | Facility: CLINIC | Age: 70
End: 2025-06-02
Payer: MEDICARE

## 2025-06-02 ENCOUNTER — TRANSITIONAL CARE MANAGEMENT TELEPHONE ENCOUNTER (OUTPATIENT)
Dept: CALL CENTER | Facility: HOSPITAL | Age: 70
End: 2025-06-02
Payer: MEDICARE

## 2025-06-02 RX ORDER — PANTOPRAZOLE SODIUM 40 MG/1
40 TABLET, DELAYED RELEASE ORAL DAILY
Qty: 90 TABLET | Refills: 0 | OUTPATIENT
Start: 2025-06-02

## 2025-06-02 RX ORDER — METOPROLOL SUCCINATE 25 MG/1
25 TABLET, EXTENDED RELEASE ORAL 2 TIMES DAILY
Qty: 180 TABLET | Refills: 0 | OUTPATIENT
Start: 2025-06-02

## 2025-06-02 NOTE — OUTREACH NOTE
Call Center TCM Note      Flowsheet Row Responses   [facility] [place] [place]   Does the patient have one of the following disease processes/diagnoses(primary or secondary)? Other   TCM attempt successful? No   Unsuccessful attempts Attempt 1   Call Status Left message            Deepak Laurent Registered Nurse    6/2/2025, 16:41 EDT

## 2025-06-02 NOTE — TELEPHONE ENCOUNTER
HUB TO RELAY    LEFT VM. MEDICATION REFILL REQUEST HAS BEEN DENIED AS PT NEEDS TO BE SEEN IN OFFICE FOR AN APPT BEFORE ANYMORE MEDICATION REFILLS WILL BE GIVEN.

## 2025-06-02 NOTE — OUTREACH NOTE
Call Center TCM Note      Flowsheet Row Responses   [facility] facility patient [place]  [place] [place]   Does the patient have one of the following disease processes/diagnoses(primary or secondary)? Other   TCM attempt successful? No   Unsuccessful attempts Attempt 2            Deepak MAYER - Registered Nurse    6/2/2025, 16:56 EDT

## 2025-06-03 ENCOUNTER — TRANSITIONAL CARE MANAGEMENT TELEPHONE ENCOUNTER (OUTPATIENT)
Dept: CALL CENTER | Facility: HOSPITAL | Age: 70
End: 2025-06-03
Payer: MEDICARE

## 2025-06-03 NOTE — OUTREACH NOTE
Call Center TCM Note      Flowsheet Row Responses   [facility] facility patient discharged from? Non-BH   Does the patient have one of the following disease processes/diagnoses(primary or secondary)? Other   TCM attempt successful? No   Unsuccessful attempts Attempt 3            Leola Laurent Registered Nurse    6/3/2025, 09:41 EDT

## 2025-07-07 NOTE — ASSESSMENT & PLAN NOTE
Blood work    Orders:    Comprehensive Metabolic Panel; Future    Hemoglobin A1c; Future    Lipid Panel; Future    PSA Screen; Future    CBC & Differential; Future    Vitamin B12; Future    Vitamin D,25-Hydroxy; Future    TSH Rfx On Abnormal To Free T4; Future

## 2025-07-07 NOTE — ASSESSMENT & PLAN NOTE
Blood work    Orders:    sildenafil (VIAGRA) 50 MG tablet; Take 1 tablet by mouth As Needed for Erectile Dysfunction.    pantoprazole (PROTONIX) 40 MG EC tablet; Take 1 tablet by mouth Daily.    Metoprolol Succinate 25 MG capsule extended-release 24 hour sprinkle; Take 25 mg by mouth 2 (Two) Times a Day.    Comprehensive Metabolic Panel; Future    Hemoglobin A1c; Future    Lipid Panel; Future    PSA Screen; Future    CBC & Differential; Future    Vitamin B12; Future    Vitamin D,25-Hydroxy; Future    TSH Rfx On Abnormal To Free T4; Future

## 2025-07-07 NOTE — ASSESSMENT & PLAN NOTE
Blood work today    Orders:    sildenafil (VIAGRA) 50 MG tablet; Take 1 tablet by mouth As Needed for Erectile Dysfunction.    pantoprazole (PROTONIX) 40 MG EC tablet; Take 1 tablet by mouth Daily.    Metoprolol Succinate 25 MG capsule extended-release 24 hour sprinkle; Take 25 mg by mouth 2 (Two) Times a Day.    Comprehensive Metabolic Panel; Future    Hemoglobin A1c; Future    Lipid Panel; Future    PSA Screen; Future    CBC & Differential; Future    Vitamin B12; Future    Vitamin D,25-Hydroxy; Future    TSH Rfx On Abnormal To Free T4; Future

## 2025-07-07 NOTE — ASSESSMENT & PLAN NOTE
Patient is instructed to not take any NSAIDs.  Medicines as directed.  Stay well-hydrated.      Orders:    sildenafil (VIAGRA) 50 MG tablet; Take 1 tablet by mouth As Needed for Erectile Dysfunction.    pantoprazole (PROTONIX) 40 MG EC tablet; Take 1 tablet by mouth Daily.    Metoprolol Succinate 25 MG capsule extended-release 24 hour sprinkle; Take 25 mg by mouth 2 (Two) Times a Day.    Comprehensive Metabolic Panel; Future    Hemoglobin A1c; Future    Lipid Panel; Future    PSA Screen; Future    CBC & Differential; Future    Vitamin B12; Future    Vitamin D,25-Hydroxy; Future    TSH Rfx On Abnormal To Free T4; Future

## 2025-07-07 NOTE — ASSESSMENT & PLAN NOTE
Blood work today    Orders:    sildenafil (VIAGRA) 50 MG tablet; Take 1 tablet by mouth As Needed for Erectile Dysfunction.    Comprehensive Metabolic Panel; Future    Hemoglobin A1c; Future    Lipid Panel; Future    PSA Screen; Future    CBC & Differential; Future    Vitamin B12; Future    Vitamin D,25-Hydroxy; Future    TSH Rfx On Abnormal To Free T4; Future

## 2025-07-09 ENCOUNTER — OFFICE VISIT (OUTPATIENT)
Dept: FAMILY MEDICINE CLINIC | Facility: CLINIC | Age: 70
End: 2025-07-09
Payer: MEDICARE

## 2025-07-09 VITALS
HEART RATE: 57 BPM | WEIGHT: 277.7 LBS | HEIGHT: 75 IN | SYSTOLIC BLOOD PRESSURE: 140 MMHG | OXYGEN SATURATION: 97 % | BODY MASS INDEX: 34.53 KG/M2 | DIASTOLIC BLOOD PRESSURE: 84 MMHG

## 2025-07-09 DIAGNOSIS — E55.9 VITAMIN D DEFICIENCY: ICD-10-CM

## 2025-07-09 DIAGNOSIS — Z00.00 MEDICARE ANNUAL WELLNESS VISIT, SUBSEQUENT: Primary | ICD-10-CM

## 2025-07-09 DIAGNOSIS — Z87.891 HISTORY OF TOBACCO USE: ICD-10-CM

## 2025-07-09 DIAGNOSIS — R97.20 INCREASED PROSTATE SPECIFIC ANTIGEN (PSA) VELOCITY: ICD-10-CM

## 2025-07-09 DIAGNOSIS — E78.2 HYPERLIPIDEMIA, MIXED: ICD-10-CM

## 2025-07-09 DIAGNOSIS — E66.01 MORBID (SEVERE) OBESITY DUE TO EXCESS CALORIES: ICD-10-CM

## 2025-07-09 DIAGNOSIS — K63.5 POLYP OF COLON, UNSPECIFIED PART OF COLON, UNSPECIFIED TYPE: ICD-10-CM

## 2025-07-09 DIAGNOSIS — Z79.899 HIGH RISK MEDICATION USE: ICD-10-CM

## 2025-07-09 DIAGNOSIS — N18.31 CHRONIC KIDNEY DISEASE, STAGE 3A: ICD-10-CM

## 2025-07-09 DIAGNOSIS — Z12.11 SCREENING FOR MALIGNANT NEOPLASM OF COLON: ICD-10-CM

## 2025-07-09 DIAGNOSIS — Z12.5 SCREENING FOR PROSTATE CANCER: ICD-10-CM

## 2025-07-09 DIAGNOSIS — R73.9 HYPERGLYCEMIA: ICD-10-CM

## 2025-07-09 DIAGNOSIS — E53.8 VITAMIN B12 DEFICIENCY: ICD-10-CM

## 2025-07-09 DIAGNOSIS — Z13.6 SCREENING FOR AAA (ABDOMINAL AORTIC ANEURYSM): ICD-10-CM

## 2025-07-09 DIAGNOSIS — I10 HYPERTENSION, ESSENTIAL: ICD-10-CM

## 2025-07-09 RX ORDER — FLUOXETINE HYDROCHLORIDE 40 MG/1
40 CAPSULE ORAL DAILY
Qty: 90 CAPSULE | Refills: 1 | Status: SHIPPED | OUTPATIENT
Start: 2025-07-09

## 2025-07-09 RX ORDER — PANTOPRAZOLE SODIUM 40 MG/1
40 TABLET, DELAYED RELEASE ORAL DAILY
Qty: 90 TABLET | Refills: 0 | Status: SHIPPED | OUTPATIENT
Start: 2025-07-09

## 2025-07-09 RX ORDER — AMLODIPINE BESYLATE 5 MG/1
5 TABLET ORAL DAILY
Qty: 90 TABLET | Refills: 1 | Status: SHIPPED | OUTPATIENT
Start: 2025-07-09

## 2025-07-09 RX ORDER — SILDENAFIL 50 MG/1
50 TABLET, FILM COATED ORAL AS NEEDED
Qty: 30 TABLET | Refills: 1 | Status: SHIPPED | OUTPATIENT
Start: 2025-07-09

## 2025-07-09 RX ORDER — LOSARTAN POTASSIUM 50 MG/1
50 TABLET ORAL DAILY
Qty: 90 TABLET | Refills: 1 | Status: SHIPPED | OUTPATIENT
Start: 2025-07-09

## 2025-07-09 NOTE — ASSESSMENT & PLAN NOTE
Orders:    sildenafil (VIAGRA) 50 MG tablet; Take 1 tablet by mouth As Needed for Erectile Dysfunction.    pantoprazole (PROTONIX) 40 MG EC tablet; Take 1 tablet by mouth Daily.    Metoprolol Succinate 25 MG capsule extended-release 24 hour sprinkle; Take 25 mg by mouth 2 (Two) Times a Day.    Comprehensive Metabolic Panel; Future    Hemoglobin A1c; Future    Lipid Panel; Future    PSA Screen; Future    CBC & Differential; Future    Vitamin B12; Future    Vitamin D,25-Hydroxy; Future    TSH Rfx On Abnormal To Free T4; Future

## 2025-07-09 NOTE — PROGRESS NOTES
Subjective   The ABCs of the Annual Wellness Visit  Medicare Wellness Visit      Ebenezer Che is a 69 y.o. patient who presents for a Medicare Wellness Visit.  And over blood pressure, weight, and blood sugar    The following portions of the patient's history were reviewed and   updated as appropriate: allergies, current medications, past family history, past medical history, past social history, past surgical history, and problem list.    Compared to one year ago, the patient's physical   health is the same.  Compared to one year ago, the patient's mental   health is the same.    Recent Hospitalizations:  He was admitted within the past 365 days at [facility].     Current Medical Providers:  Patient Care Team:  Epifanio Goodman MD as PCP - General (Family Medicine)    Outpatient Medications Prior to Visit   Medication Sig Dispense Refill    pravastatin (PRAVACHOL) 40 MG tablet Take 1 tablet by mouth Daily. 90 tablet 0    amLODIPine (NORVASC) 5 MG tablet Take 1 tablet by mouth once daily 90 tablet 1    FLUoxetine (PROzac) 40 MG capsule Take 1 capsule by mouth once daily 90 capsule 1    Metoprolol Succinate 25 MG capsule extended-release 24 hour sprinkle Take 25 mg by mouth 2 (Two) Times a Day. 180 capsule 1    pantoprazole (PROTONIX) 40 MG EC tablet Take 1 tablet by mouth once daily 90 tablet 0    sildenafil (VIAGRA) 50 MG tablet Take 1 tablet by mouth As Needed for Erectile Dysfunction. 30 tablet 1     No facility-administered medications prior to visit.     No opioid medication identified on active medication list. I have reviewed chart for other potential  high risk medication/s and harmful drug interactions in the elderly.      Aspirin is not on active medication list.  Aspirin use is not indicated based on review of current medical condition/s. Risk of harm outweighs potential benefits.  .    Patient Active Problem List   Diagnosis    Hyperlipidemia, mixed    High risk medication use    History of tobacco  "use    Screening for malignant neoplasm of colon    Hypertension, essential    Chronic kidney disease, stage 3a    Vitamin D deficiency    Increased prostate specific antigen (PSA) velocity    Vitamin B12 deficiency    COVID-19 virus infection    Hyperglycemia    Moderate episode of recurrent major depressive disorder    Erectile dysfunction    Emotional lability    Morbid (severe) obesity due to excess calories    Numbness and tingling of right arm    Polyp of colon     Advance Care Planning Advance Directive is not on file.  ACP discussion was held with the patient during this visit. Patient does not have an advance directive, information provided.            Objective   Vitals:    25 0902   BP: 140/84   BP Location: Left arm   Patient Position: Sitting   Cuff Size: Large Adult   Pulse: 57   SpO2: 97%   Weight: 126 kg (277 lb 11.2 oz)   Height: 190.5 cm (75\")   PainSc: 2    PainLoc: Back       Estimated body mass index is 34.71 kg/m² as calculated from the following:    Height as of this encounter: 190.5 cm (75\").    Weight as of this encounter: 126 kg (277 lb 11.2 oz).    BMI is >= 30 and <35. (Class 1 Obesity). The following options were offered after discussion;: weight loss educational material (shared in after visit summary), exercise counseling/recommendations, and nutrition counseling/recommendations           Does the patient have evidence of cognitive impairment? No                                                                                                Health  Risk Assessment    Smoking Status:  Social History     Tobacco Use   Smoking Status Former    Current packs/day: 0.00    Average packs/day: 0.3 packs/day for 36.0 years (9.0 ttl pk-yrs)    Types: Cigarettes    Start date:     Quit date:     Years since quittin.5    Passive exposure: Past   Smokeless Tobacco Never     Alcohol Consumption:  Social History     Substance and Sexual Activity   Alcohol Use Not Currently    " Alcohol/week: 7.0 standard drinks of alcohol    Types: 7 Shots of liquor per week       Fall Risk Screen  SUPRIYA Fall Risk Assessment was completed, and patient is at LOW risk for falls.Assessment completed on:2025    Depression Screening   Little interest or pleasure in doing things? Not at all   Feeling down, depressed, or hopeless? Not at all   PHQ-2 Total Score 0      Health Habits and Functional and Cognitive Screenin/9/2025     9:06 AM   Functional & Cognitive Status   Do you have difficulty preparing food and eating? No   Do you have difficulty bathing yourself, getting dressed or grooming yourself? No   Do you have difficulty using the toilet? No   Do you have difficulty moving around from place to place? No   Do you have trouble with steps or getting out of a bed or a chair? No   Current Diet Well Balanced Diet   Dental Exam Up to date   Eye Exam Up to date   Current Exercises Include Yard Work;Walking;Gardening   Do you need help using the phone?  No   Do you need help to go to places out of walking distance? No   Do you need help shopping? No   Do you need help preparing meals?  No   Do you need help with housework?  No   Do you need help with laundry? No   Do you need help taking your medications? No   Do you need help managing money? No   Do you ever drive or ride in a car without wearing a seat belt? No   Have you felt unusual fatigue (could be tiredness), stress, anger or loneliness in the last month? No   Who do you live with? Spouse   If you need help, do you have trouble finding someone available to you? No   Have you been bothered in the last four weeks by sexual problems? No   Do you have difficulty concentrating, remembering or making decisions? No           Age-appropriate Screening Schedule:  Refer to the list below for future screening recommendations based on patient's age, sex and/or medical conditions. Orders for these recommended tests are listed in the plan section. The  "patient has been provided with a written plan.    Health Maintenance List  Health Maintenance   Topic Date Due    ZOSTER VACCINE (1 of 2) Never done    COVID-19 Vaccine (4 - 2024-25 season) 09/01/2024    COLORECTAL CANCER SCREENING  03/03/2025    INFLUENZA VACCINE  10/01/2025    LIPID PANEL  10/03/2025    ANNUAL WELLNESS VISIT  07/09/2026    TDAP/TD VACCINES (6 - Td or Tdap) 11/02/2028    HEPATITIS C SCREENING  Completed    Pneumococcal Vaccine 50+  Completed    AAA SCREEN ONCE  Completed                                                                                                                                                CMS Preventative Services Quick Reference  Risk Factors Identified During Encounter  None Identified    The above risks/problems have been discussed with the patient.  Pertinent information has been shared with the patient in the After Visit Summary.  An After Visit Summary and PPPS were made available to the patient.    Follow Up:   Next Medicare Wellness visit to be scheduled in 1 year.         Additional E&M Note during same encounter follows:  Patient has additional, significant, and separately identifiable condition(s)/problem(s) that require work above and beyond the Medicare Wellness Visit     Chief Complaint  Saw dust in [facility] and Medicare Wellness-subsequent    Subjective   HPI  Ebenezer is also being seen today for an annual adult preventative physical exam.  and Ebenezer is also being seen today for additional medical problem/s.    Review of Systems   Constitutional: Negative.    HENT: Negative.     Eyes: Negative.    Respiratory: Negative.     Cardiovascular: Negative.    Gastrointestinal: Negative.    Neurological: Negative.               Objective   Vital Signs:  /84 (BP Location: Left arm, Patient Position: Sitting, Cuff Size: Large Adult)   Pulse 57   Ht 190.5 cm (75\")   Wt 126 kg (277 lb 11.2 oz)   SpO2 97%   BMI 34.71 kg/m²   Physical Exam  Vitals and nursing note " reviewed.   Constitutional:       Appearance: Normal appearance. He is normal weight.   HENT:      Head: Normocephalic and atraumatic.      Right Ear: Tympanic membrane, ear canal and external ear normal.      Left Ear: Tympanic membrane, ear canal and external ear normal.      Nose: Nose normal.      Mouth/Throat:      Mouth: Mucous membranes are moist.      Pharynx: Oropharynx is clear.   Eyes:      Extraocular Movements: Extraocular movements intact.      Conjunctiva/sclera: Conjunctivae normal.      Pupils: Pupils are equal, round, and reactive to light.   Cardiovascular:      Rate and Rhythm: Normal rate and regular rhythm.      Pulses: Normal pulses.      Heart sounds: Normal heart sounds.   Pulmonary:      Effort: Pulmonary effort is normal.      Breath sounds: Normal breath sounds.   Abdominal:      General: Abdomen is flat. Bowel sounds are normal.      Palpations: Abdomen is soft.   Musculoskeletal:         General: Normal range of motion.      Cervical back: Normal range of motion and neck supple.   Feet:      Comments:      Skin:     General: Skin is warm and dry.   Neurological:      General: No focal deficit present.      Mental Status: He is alert and oriented to person, place, and time.   Psychiatric:         Mood and Affect: Mood normal.         Behavior: Behavior normal.         Thought Content: Thought content normal.         Judgment: Judgment normal.             CMP          10/3/2024    10:06   CMP   Glucose 94    BUN 19    Creatinine 1.17    EGFR 68    Sodium 140    Potassium 4.7    Chloride 103    Calcium 9.2    Total Protein 6.8    Albumin 4.6    Globulin 2.2    Total Bilirubin 0.5    Alkaline Phosphatase 52    AST (SGOT) 17    ALT (SGPT) 19    BUN/Creatinine Ratio 16      CBC w/diff          10/3/2024    10:06   CBC w/Diff   WBC 7.1    RBC 4.71    Hemoglobin 14.3    Hematocrit 43.0    MCV 91    MCH 30.4    MCHC 33.3    RDW 13.0    Platelets 262    Neutrophil Rel % 53    Lymphocyte Rel % 33     Monocyte Rel % 9    Eosinophil Rel % 4    Basophil Rel % 1      Lipid Panel          10/3/2024    10:06   Lipid Panel   Total Cholesterol 191    Triglycerides 120    HDL Cholesterol 47    VLDL Cholesterol 22    LDL Cholesterol  122      TSH          10/3/2024    10:06   TSH   TSH 1.400      Most Recent A1C          10/3/2024    10:06   HGBA1C Most Recent   Hemoglobin A1C 6.1           Assessment and Plan Additional age appropriate preventative wellness advice topics were discussed during today's preventative wellness exam(some topics already addressed during AWV portion of the note above):    Physical Activity: Advised cardiovascular activity 150 minutes per week as tolerated. (example brisk walk for 30 minutes, 5 days a week).     Nutrition: Discussed nutrition plan with patient. Information shared in after visit summary. Goal is for a well balanced diet to enhance overall health.     Healthy Weight: Discussed current and goal BMI with patient. Steps to attain this goal discussed. Information shared in after visit summary.     Medicare annual wellness visit, subsequent  We discussed health maintenance, diet, and exercise  Orders:    Comprehensive Metabolic Panel; Future    Hemoglobin A1c; Future    Lipid Panel; Future    PSA Screen; Future    CBC & Differential; Future    Vitamin B12; Future    Vitamin D,25-Hydroxy; Future    TSH Rfx On Abnormal To Free T4; Future    Hypertension, essential  Discussed with patient to monitor their blood pressure and if systolic blood pressure goes above 140 or diastolic is above 90 to return to clinic.  Take medicines as directed, call for any problems, patient not having or any worrisome symptoms.    Continue to monitor his blood pressure once a day.  We are going to add losartan to his regimen.    Orders:    sildenafil (VIAGRA) 50 MG tablet; Take 1 tablet by mouth As Needed for Erectile Dysfunction.    pantoprazole (PROTONIX) 40 MG EC tablet; Take 1 tablet by mouth Daily.     Metoprolol Succinate 25 MG capsule extended-release 24 hour sprinkle; Take 25 mg by mouth 2 (Two) Times a Day.    amLODIPine (NORVASC) 5 MG tablet; Take 1 tablet by mouth Daily.    Comprehensive Metabolic Panel; Future    Hemoglobin A1c; Future    Lipid Panel; Future    PSA Screen; Future    CBC & Differential; Future    Vitamin B12; Future    Vitamin D,25-Hydroxy; Future    TSH Rfx On Abnormal To Free T4; Future    Hyperglycemia  Blood work    Orders:    Comprehensive Metabolic Panel; Future    Hemoglobin A1c; Future    Lipid Panel; Future    PSA Screen; Future    CBC & Differential; Future    Vitamin B12; Future    Vitamin D,25-Hydroxy; Future    TSH Rfx On Abnormal To Free T4; Future    Hyperlipidemia, mixed  Blood work    Orders:    sildenafil (VIAGRA) 50 MG tablet; Take 1 tablet by mouth As Needed for Erectile Dysfunction.    pantoprazole (PROTONIX) 40 MG EC tablet; Take 1 tablet by mouth Daily.    Metoprolol Succinate 25 MG capsule extended-release 24 hour sprinkle; Take 25 mg by mouth 2 (Two) Times a Day.    Comprehensive Metabolic Panel; Future    Hemoglobin A1c; Future    Lipid Panel; Future    PSA Screen; Future    CBC & Differential; Future    Vitamin B12; Future    Vitamin D,25-Hydroxy; Future    TSH Rfx On Abnormal To Free T4; Future    Vitamin B12 deficiency  Blood work today    Orders:    sildenafil (VIAGRA) 50 MG tablet; Take 1 tablet by mouth As Needed for Erectile Dysfunction.    Comprehensive Metabolic Panel; Future    Hemoglobin A1c; Future    Lipid Panel; Future    PSA Screen; Future    CBC & Differential; Future    Vitamin B12; Future    Vitamin D,25-Hydroxy; Future    TSH Rfx On Abnormal To Free T4; Future    Vitamin D deficiency  Blood work today    Orders:    sildenafil (VIAGRA) 50 MG tablet; Take 1 tablet by mouth As Needed for Erectile Dysfunction.    pantoprazole (PROTONIX) 40 MG EC tablet; Take 1 tablet by mouth Daily.    Metoprolol Succinate 25 MG capsule extended-release 24 hour  sprinkle; Take 25 mg by mouth 2 (Two) Times a Day.    Comprehensive Metabolic Panel; Future    Hemoglobin A1c; Future    Lipid Panel; Future    PSA Screen; Future    CBC & Differential; Future    Vitamin B12; Future    Vitamin D,25-Hydroxy; Future    TSH Rfx On Abnormal To Free T4; Future    Chronic kidney disease, stage 3a  Patient is instructed to not take any NSAIDs.  Medicines as directed.  Stay well-hydrated.      Orders:    sildenafil (VIAGRA) 50 MG tablet; Take 1 tablet by mouth As Needed for Erectile Dysfunction.    pantoprazole (PROTONIX) 40 MG EC tablet; Take 1 tablet by mouth Daily.    Metoprolol Succinate 25 MG capsule extended-release 24 hour sprinkle; Take 25 mg by mouth 2 (Two) Times a Day.    Comprehensive Metabolic Panel; Future    Hemoglobin A1c; Future    Lipid Panel; Future    PSA Screen; Future    CBC & Differential; Future    Vitamin B12; Future    Vitamin D,25-Hydroxy; Future    TSH Rfx On Abnormal To Free T4; Future    Screening for malignant neoplasm of colon  Patient with a history of colon polyps.  We will send him to Dr. Carpoi for colonoscopy per his request.  Orders:    Comprehensive Metabolic Panel; Future    Hemoglobin A1c; Future    Lipid Panel; Future    PSA Screen; Future    CBC & Differential; Future    Vitamin B12; Future    Vitamin D,25-Hydroxy; Future    TSH Rfx On Abnormal To Free T4; Future    Polyp of colon, unspecified part of colon, unspecified type    Orders:    Ambulatory Referral to General Surgery    Comprehensive Metabolic Panel; Future    Hemoglobin A1c; Future    Lipid Panel; Future    PSA Screen; Future    CBC & Differential; Future    Vitamin B12; Future    Vitamin D,25-Hydroxy; Future    TSH Rfx On Abnormal To Free T4; Future    High risk medication use    Orders:    sildenafil (VIAGRA) 50 MG tablet; Take 1 tablet by mouth As Needed for Erectile Dysfunction.    pantoprazole (PROTONIX) 40 MG EC tablet; Take 1 tablet by mouth Daily.    Metoprolol Succinate 25 MG  capsule extended-release 24 hour sprinkle; Take 25 mg by mouth 2 (Two) Times a Day.    Comprehensive Metabolic Panel; Future    Hemoglobin A1c; Future    Lipid Panel; Future    PSA Screen; Future    CBC & Differential; Future    Vitamin B12; Future    Vitamin D,25-Hydroxy; Future    TSH Rfx On Abnormal To Free T4; Future    History of tobacco use  Patient had AAA screen 3 years ago.  Orders:    sildenafil (VIAGRA) 50 MG tablet; Take 1 tablet by mouth As Needed for Erectile Dysfunction.    pantoprazole (PROTONIX) 40 MG EC tablet; Take 1 tablet by mouth Daily.    Metoprolol Succinate 25 MG capsule extended-release 24 hour sprinkle; Take 25 mg by mouth 2 (Two) Times a Day.    Comprehensive Metabolic Panel; Future    Hemoglobin A1c; Future    Lipid Panel; Future    PSA Screen; Future    CBC & Differential; Future    Vitamin B12; Future    Vitamin D,25-Hydroxy; Future    TSH Rfx On Abnormal To Free T4; Future    Increased prostate specific antigen (PSA) velocity    Orders:    sildenafil (VIAGRA) 50 MG tablet; Take 1 tablet by mouth As Needed for Erectile Dysfunction.    pantoprazole (PROTONIX) 40 MG EC tablet; Take 1 tablet by mouth Daily.    Metoprolol Succinate 25 MG capsule extended-release 24 hour sprinkle; Take 25 mg by mouth 2 (Two) Times a Day.    Comprehensive Metabolic Panel; Future    Hemoglobin A1c; Future    Lipid Panel; Future    PSA Screen; Future    CBC & Differential; Future    Vitamin B12; Future    Vitamin D,25-Hydroxy; Future    TSH Rfx On Abnormal To Free T4; Future    Screening for prostate cancer    Orders:    sildenafil (VIAGRA) 50 MG tablet; Take 1 tablet by mouth As Needed for Erectile Dysfunction.    pantoprazole (PROTONIX) 40 MG EC tablet; Take 1 tablet by mouth Daily.    Metoprolol Succinate 25 MG capsule extended-release 24 hour sprinkle; Take 25 mg by mouth 2 (Two) Times a Day.    Comprehensive Metabolic Panel; Future    Hemoglobin A1c; Future    Lipid Panel; Future    PSA Screen; Future     CBC & Differential; Future    Vitamin B12; Future    Vitamin D,25-Hydroxy; Future    TSH Rfx On Abnormal To Free T4; Future    Screening for AAA (abdominal aortic aneurysm)  Had this accomplished 3 years ago.  He only has 10 pack years of smoking and quit smoking 15 years ago.  Does not qualify for low-dose CAT scan  Orders:    sildenafil (VIAGRA) 50 MG tablet; Take 1 tablet by mouth As Needed for Erectile Dysfunction.    Comprehensive Metabolic Panel; Future    Hemoglobin A1c; Future    Lipid Panel; Future    PSA Screen; Future    CBC & Differential; Future    Vitamin B12; Future    Vitamin D,25-Hydroxy; Future    TSH Rfx On Abnormal To Free T4; Future    Morbid (severe) obesity due to excess calories  Patient's (Body mass index is 34.71 kg/m².) indicates that they are obese (BMI >30) with health conditions that include hypertension . Weight is worsening. BMI  is above average; BMI management plan is completed. We discussed portion control and increasing exercise    Had a discussion about GLP-1's.  He would be a candidate.  He does not want to do it at this time.                 Follow Up   No follow-ups on file.  Patient was given instructions and counseling regarding his condition or for health maintenance advice. Please see specific information pulled into the AVS if appropriate.

## 2025-07-09 NOTE — ASSESSMENT & PLAN NOTE
Patient with a history of colon polyps.  We will send him to Dr. Carpio for colonoscopy per his request.  Orders:    Comprehensive Metabolic Panel; Future    Hemoglobin A1c; Future    Lipid Panel; Future    PSA Screen; Future    CBC & Differential; Future    Vitamin B12; Future    Vitamin D,25-Hydroxy; Future    TSH Rfx On Abnormal To Free T4; Future

## 2025-07-09 NOTE — ASSESSMENT & PLAN NOTE
Patient's (Body mass index is 34.71 kg/m².) indicates that they are obese (BMI >30) with health conditions that include hypertension . Weight is worsening. BMI  is above average; BMI management plan is completed. We discussed portion control and increasing exercise    Had a discussion about GLP-1's.  He would be a candidate.  He does not want to do it at this time.

## 2025-07-09 NOTE — ASSESSMENT & PLAN NOTE
Orders:    Ambulatory Referral to General Surgery    Comprehensive Metabolic Panel; Future    Hemoglobin A1c; Future    Lipid Panel; Future    PSA Screen; Future    CBC & Differential; Future    Vitamin B12; Future    Vitamin D,25-Hydroxy; Future    TSH Rfx On Abnormal To Free T4; Future

## 2025-07-09 NOTE — ASSESSMENT & PLAN NOTE
Patient had AAA screen 3 years ago.  Orders:    sildenafil (VIAGRA) 50 MG tablet; Take 1 tablet by mouth As Needed for Erectile Dysfunction.    pantoprazole (PROTONIX) 40 MG EC tablet; Take 1 tablet by mouth Daily.    Metoprolol Succinate 25 MG capsule extended-release 24 hour sprinkle; Take 25 mg by mouth 2 (Two) Times a Day.    Comprehensive Metabolic Panel; Future    Hemoglobin A1c; Future    Lipid Panel; Future    PSA Screen; Future    CBC & Differential; Future    Vitamin B12; Future    Vitamin D,25-Hydroxy; Future    TSH Rfx On Abnormal To Free T4; Future

## 2025-07-10 ENCOUNTER — RESULTS FOLLOW-UP (OUTPATIENT)
Dept: FAMILY MEDICINE CLINIC | Facility: CLINIC | Age: 70
End: 2025-07-10
Payer: MEDICARE

## 2025-07-10 LAB
25(OH)D3+25(OH)D2 SERPL-MCNC: 62.6 NG/ML (ref 30–100)
ALBUMIN SERPL-MCNC: 4.4 G/DL (ref 3.9–4.9)
ALP SERPL-CCNC: 57 IU/L (ref 44–121)
ALT SERPL-CCNC: 23 IU/L (ref 0–44)
AST SERPL-CCNC: 18 IU/L (ref 0–40)
BASOPHILS # BLD AUTO: 0.1 X10E3/UL (ref 0–0.2)
BASOPHILS NFR BLD AUTO: 1 %
BILIRUB SERPL-MCNC: 0.5 MG/DL (ref 0–1.2)
BUN SERPL-MCNC: 15 MG/DL (ref 8–27)
BUN/CREAT SERPL: 14 (ref 10–24)
CALCIUM SERPL-MCNC: 9.5 MG/DL (ref 8.6–10.2)
CHLORIDE SERPL-SCNC: 103 MMOL/L (ref 96–106)
CHOLEST SERPL-MCNC: 203 MG/DL (ref 100–199)
CO2 SERPL-SCNC: 22 MMOL/L (ref 20–29)
CREAT SERPL-MCNC: 1.08 MG/DL (ref 0.76–1.27)
EGFRCR SERPLBLD CKD-EPI 2021: 75 ML/MIN/1.73
EOSINOPHIL # BLD AUTO: 0.2 X10E3/UL (ref 0–0.4)
EOSINOPHIL NFR BLD AUTO: 3 %
ERYTHROCYTE [DISTWIDTH] IN BLOOD BY AUTOMATED COUNT: 13.3 % (ref 11.6–15.4)
GLOBULIN SER CALC-MCNC: 1.7 G/DL (ref 1.5–4.5)
GLUCOSE SERPL-MCNC: 110 MG/DL (ref 70–99)
HBA1C MFR BLD: 6.1 % (ref 4.8–5.6)
HCT VFR BLD AUTO: 42.5 % (ref 37.5–51)
HDLC SERPL-MCNC: 51 MG/DL
HGB BLD-MCNC: 13.5 G/DL (ref 13–17.7)
IMM GRANULOCYTES # BLD AUTO: 0 X10E3/UL (ref 0–0.1)
IMM GRANULOCYTES NFR BLD AUTO: 0 %
LDLC SERPL CALC-MCNC: 116 MG/DL (ref 0–99)
LYMPHOCYTES # BLD AUTO: 2.2 X10E3/UL (ref 0.7–3.1)
LYMPHOCYTES NFR BLD AUTO: 31 %
MCH RBC QN AUTO: 29.2 PG (ref 26.6–33)
MCHC RBC AUTO-ENTMCNC: 31.8 G/DL (ref 31.5–35.7)
MCV RBC AUTO: 92 FL (ref 79–97)
MONOCYTES # BLD AUTO: 0.7 X10E3/UL (ref 0.1–0.9)
MONOCYTES NFR BLD AUTO: 10 %
NEUTROPHILS # BLD AUTO: 3.9 X10E3/UL (ref 1.4–7)
NEUTROPHILS NFR BLD AUTO: 55 %
PLATELET # BLD AUTO: 263 X10E3/UL (ref 150–450)
POTASSIUM SERPL-SCNC: 4.8 MMOL/L (ref 3.5–5.2)
PROT SERPL-MCNC: 6.1 G/DL (ref 6–8.5)
PSA SERPL-MCNC: 2 NG/ML (ref 0–4)
RBC # BLD AUTO: 4.62 X10E6/UL (ref 4.14–5.8)
SODIUM SERPL-SCNC: 142 MMOL/L (ref 134–144)
TRIGL SERPL-MCNC: 209 MG/DL (ref 0–149)
TSH SERPL DL<=0.005 MIU/L-ACNC: 2.09 UIU/ML (ref 0.45–4.5)
VIT B12 SERPL-MCNC: 268 PG/ML (ref 232–1245)
VLDLC SERPL CALC-MCNC: 36 MG/DL (ref 5–40)
WBC # BLD AUTO: 7 X10E3/UL (ref 3.4–10.8)

## 2025-07-10 NOTE — LETTER
Ebenezer Yane  [address] [place] [id number]    July 10, 2025     Dear Mr. Che:    Below are the results from your recent visit:    Resulted Orders   TSH Rfx On Abnormal To Free T4   Result Value Ref Range    TSH 2.090 0.450 - 4.500 uIU/mL   Vitamin D,25-Hydroxy   Result Value Ref Range    25 Hydroxy, Vitamin D 62.6 30.0 - 100.0 ng/mL      Comment:      Vitamin D deficiency has been defined by the [place] [employer] and an Endocrine Society practice guideline as a  level of serum 25-OH vitamin D less than 20 ng/mL (1,2).  The Endocrine Society went on to further define vitamin D  insufficiency as a level between 21 and 29 ng/mL (2).  1. IOM ([place] [employer]). 2010. Dietary reference     intakes for calcium and D. [place]: The     National Academies Press.  2. Theresa MF, Gilberto BARROW, Sofia MYERS, et al.     Evaluation, treatment, and prevention of vitamin D     deficiency: an Endocrine Society clinical practice     guideline. JCEM. 2011 Jul; 96(7):1911-30.     Vitamin B12   Result Value Ref Range    Vitamin B-12 268 232 - 1,245 pg/mL   CBC & Differential   Result Value Ref Range    WBC 7.0 3.4 - 10.8 x10E3/uL    RBC 4.62 4.14 - 5.80 x10E6/uL    Hemoglobin 13.5 13.0 - 17.7 g/dL    Hematocrit 42.5 37.5 - 51.0 %    MCV 92 79 - 97 fL    MCH 29.2 26.6 - 33.0 pg    MCHC 31.8 31.5 - 35.7 g/dL    RDW 13.3 11.6 - 15.4 %    Platelets 263 150 - 450 x10E3/uL    Neutrophil Rel % 55 Not Estab. %    Lymphocyte Rel % 31 Not Estab. %    Monocyte Rel % 10 Not Estab. %    Eosinophil Rel % 3 Not Estab. %    Basophil Rel % 1 Not Estab. %    Neutrophils Absolute 3.9 1.4 - 7.0 x10E3/uL    Lymphocytes Absolute 2.2 0.7 - 3.1 x10E3/uL    Monocytes Absolute 0.7 0.1 - 0.9 x10E3/uL    Eosinophils Absolute 0.2 0.0 - 0.4 x10E3/uL    Basophils Absolute 0.1 0.0 - 0.2 x10E3/uL    Immature Granulocyte Rel % 0 Not Estab. %    Immature Grans Absolute 0.0 0.0 - 0.1 x10E3/uL   PSA Screen   Result Value Ref Range    PSA  2.0 0.0 - 4.0 ng/mL      Comment:      Roche ECLIA methodology.  According to the American U[employer] Association, Serum PSA should  decrease and remain at undetectable levels after radical  prostatectomy. The AUA defines biochemical recurrence as an initial  PSA value 0.2 ng/mL or greater followed by a subsequent confirmatory  PSA value 0.2 ng/mL or greater.  Values obtained with different assay methods or kits cannot be used  interchangeably. Results cannot be interpreted as absolute evidence  of the presence or absence of malignant disease.     Lipid Panel   Result Value Ref Range    Total Cholesterol 203 (H) 100 - 199 mg/dL    Triglycerides 209 (H) 0 - 149 mg/dL    HDL Cholesterol 51 >39 mg/dL    VLDL Cholesterol Abr[place] 36 5 - 40 mg/dL    LDL Chol Calc (NIH) 116 (H) 0 - 99 mg/dL   Hemoglobin A1c   Result Value Ref Range    Hemoglobin A1C 6.1 (H) 4.8 - 5.6 %      Comment:               Prediabetes: 5.7 - 6.4           Diabetes: >6.4           Glycemic control for adults with diabetes: <7.0     Comprehensive Metabolic Panel   Result Value Ref Range    Glucose 110 (H) 70 - 99 mg/dL    BUN 15 8 - 27 mg/dL    Creatinine 1.08 0.76 - 1.27 mg/dL    EGFR Result 75 >59 mL/min/1.73    BUN/Creatinine Ratio 14 10 - 24    Sodium 142 134 - 144 mmol/L    Potassium 4.8 3.5 - 5.2 mmol/L    Chloride 103 96 - 106 mmol/L    Total CO2 22 20 - 29 mmol/L    Calcium 9.5 8.6 - 10.2 mg/dL    Total Protein 6.1 6.0 - 8.5 g/dL    Albumin 4.4 3.9 - 4.9 g/dL    Globulin 1.7 1.5 - 4.5 g/dL    Total Bilirubin 0.5 0.0 - 1.2 mg/dL    Alkaline Phosphatase 57 44 - 121 IU/L    AST (SGOT) 18 0 - 40 IU/L    ALT (SGPT) 23 0 - 44 IU/L     Blood work is stable, cont routine appointments   If you have any questions or concerns, please don't hesitate to call.         Sincerely,        Epifanio Goodman MD